# Patient Record
Sex: FEMALE | Race: WHITE | Employment: UNEMPLOYED | URBAN - METROPOLITAN AREA
[De-identification: names, ages, dates, MRNs, and addresses within clinical notes are randomized per-mention and may not be internally consistent; named-entity substitution may affect disease eponyms.]

---

## 2017-04-20 ENCOUNTER — OFFICE VISIT (OUTPATIENT)
Dept: ORTHOPEDIC SURGERY | Age: 62
End: 2017-04-20

## 2017-04-20 ENCOUNTER — TELEPHONE (OUTPATIENT)
Dept: ORTHOPEDIC SURGERY | Age: 62
End: 2017-04-20

## 2017-04-20 VITALS — HEIGHT: 66 IN | BODY MASS INDEX: 25.86 KG/M2 | WEIGHT: 160.94 LBS

## 2017-04-20 DIAGNOSIS — S52.532A CLOSED COLLES' FRACTURE OF LEFT RADIUS, INITIAL ENCOUNTER: ICD-10-CM

## 2017-04-20 DIAGNOSIS — M25.532 LEFT WRIST PAIN: Primary | ICD-10-CM

## 2017-04-20 DIAGNOSIS — G56.02 LEFT CARPAL TUNNEL SYNDROME: ICD-10-CM

## 2017-04-20 PROCEDURE — L3660 SO 8 AB RSTR CAN/WEB PRE OTS: HCPCS | Performed by: ORTHOPAEDIC SURGERY

## 2017-04-20 PROCEDURE — 73110 X-RAY EXAM OF WRIST: CPT | Performed by: ORTHOPAEDIC SURGERY

## 2017-04-20 PROCEDURE — 99203 OFFICE O/P NEW LOW 30 MIN: CPT | Performed by: ORTHOPAEDIC SURGERY

## 2017-04-20 PROCEDURE — 29125 APPL SHORT ARM SPLINT STATIC: CPT | Performed by: ORTHOPAEDIC SURGERY

## 2017-04-20 RX ORDER — OXYCODONE HYDROCHLORIDE AND ACETAMINOPHEN 5; 325 MG/1; MG/1
1 TABLET ORAL EVERY 6 HOURS PRN
Qty: 10 TABLET | Refills: 0 | Status: SHIPPED | OUTPATIENT
Start: 2017-04-20 | End: 2017-04-27

## 2017-04-23 RX ORDER — MORPHINE SULFATE 2 MG/ML
1 INJECTION, SOLUTION INTRAMUSCULAR; INTRAVENOUS EVERY 5 MIN PRN
Status: DISCONTINUED | OUTPATIENT
Start: 2017-04-23 | End: 2017-04-25 | Stop reason: HOSPADM

## 2017-04-23 RX ORDER — DIPHENHYDRAMINE HYDROCHLORIDE 50 MG/ML
12.5 INJECTION INTRAMUSCULAR; INTRAVENOUS
Status: ACTIVE | OUTPATIENT
Start: 2017-04-23 | End: 2017-04-23

## 2017-04-23 RX ORDER — LABETALOL HYDROCHLORIDE 5 MG/ML
5 INJECTION, SOLUTION INTRAVENOUS EVERY 10 MIN PRN
Status: DISCONTINUED | OUTPATIENT
Start: 2017-04-23 | End: 2017-04-25 | Stop reason: HOSPADM

## 2017-04-23 RX ORDER — MEPERIDINE HYDROCHLORIDE 25 MG/ML
12.5 INJECTION INTRAMUSCULAR; INTRAVENOUS; SUBCUTANEOUS EVERY 5 MIN PRN
Status: DISCONTINUED | OUTPATIENT
Start: 2017-04-23 | End: 2017-04-25 | Stop reason: HOSPADM

## 2017-04-23 RX ORDER — OXYCODONE HYDROCHLORIDE AND ACETAMINOPHEN 5; 325 MG/1; MG/1
2 TABLET ORAL PRN
Status: ACTIVE | OUTPATIENT
Start: 2017-04-23 | End: 2017-04-23

## 2017-04-23 RX ORDER — OXYCODONE HYDROCHLORIDE AND ACETAMINOPHEN 5; 325 MG/1; MG/1
1 TABLET ORAL PRN
Status: ACTIVE | OUTPATIENT
Start: 2017-04-23 | End: 2017-04-23

## 2017-04-23 RX ORDER — HYDRALAZINE HYDROCHLORIDE 20 MG/ML
5 INJECTION INTRAMUSCULAR; INTRAVENOUS EVERY 10 MIN PRN
Status: DISCONTINUED | OUTPATIENT
Start: 2017-04-23 | End: 2017-04-25 | Stop reason: HOSPADM

## 2017-04-23 RX ORDER — ONDANSETRON 2 MG/ML
4 INJECTION INTRAMUSCULAR; INTRAVENOUS
Status: ACTIVE | OUTPATIENT
Start: 2017-04-23 | End: 2017-04-23

## 2017-04-23 RX ORDER — MORPHINE SULFATE 2 MG/ML
2 INJECTION, SOLUTION INTRAMUSCULAR; INTRAVENOUS EVERY 5 MIN PRN
Status: DISCONTINUED | OUTPATIENT
Start: 2017-04-23 | End: 2017-04-25 | Stop reason: HOSPADM

## 2017-04-23 RX ORDER — PROMETHAZINE HYDROCHLORIDE 25 MG/ML
6.25 INJECTION, SOLUTION INTRAMUSCULAR; INTRAVENOUS
Status: ACTIVE | OUTPATIENT
Start: 2017-04-23 | End: 2017-04-23

## 2017-04-24 ENCOUNTER — HOSPITAL ENCOUNTER (OUTPATIENT)
Dept: SURGERY | Age: 62
Discharge: HOME | End: 2017-04-24
Attending: ORTHOPAEDIC SURGERY | Admitting: ORTHOPAEDIC SURGERY

## 2017-04-24 VITALS
OXYGEN SATURATION: 95 % | TEMPERATURE: 96.9 F | WEIGHT: 160 LBS | SYSTOLIC BLOOD PRESSURE: 138 MMHG | RESPIRATION RATE: 18 BRPM | BODY MASS INDEX: 25.71 KG/M2 | HEIGHT: 66 IN | DIASTOLIC BLOOD PRESSURE: 70 MMHG | HEART RATE: 95 BPM

## 2017-04-24 RX ORDER — ONDANSETRON 4 MG/1
4 TABLET, FILM COATED ORAL EVERY 8 HOURS PRN
Qty: 30 TABLET | Refills: 2 | Status: SHIPPED | OUTPATIENT
Start: 2017-04-24 | End: 2021-01-01

## 2017-04-24 RX ORDER — LIDOCAINE HYDROCHLORIDE 10 MG/ML
1 INJECTION, SOLUTION EPIDURAL; INFILTRATION; INTRACAUDAL; PERINEURAL
Status: ACTIVE | OUTPATIENT
Start: 2017-04-24 | End: 2017-04-24

## 2017-04-24 RX ORDER — LORATADINE 10 MG/1
10 TABLET ORAL DAILY
COMMUNITY
End: 2021-01-01

## 2017-04-24 RX ORDER — MIDAZOLAM HYDROCHLORIDE 1 MG/ML
INJECTION INTRAMUSCULAR; INTRAVENOUS
Status: DISPENSED
Start: 2017-04-24 | End: 2017-04-24

## 2017-04-24 RX ORDER — OXYCODONE HYDROCHLORIDE AND ACETAMINOPHEN 5; 325 MG/1; MG/1
1-2 TABLET ORAL EVERY 6 HOURS PRN
Qty: 50 TABLET | Refills: 0 | Status: SHIPPED | OUTPATIENT
Start: 2017-04-24 | End: 2017-05-01

## 2017-04-24 RX ORDER — IBUPROFEN 800 MG/1
800 TABLET ORAL EVERY 8 HOURS PRN
Qty: 60 TABLET | Refills: 3 | Status: SHIPPED | OUTPATIENT
Start: 2017-04-24

## 2017-04-24 RX ORDER — ALBUTEROL SULFATE 90 UG/1
2 AEROSOL, METERED RESPIRATORY (INHALATION) EVERY 6 HOURS PRN
COMMUNITY

## 2017-04-24 RX ORDER — FENTANYL CITRATE 50 UG/ML
INJECTION, SOLUTION INTRAMUSCULAR; INTRAVENOUS
Status: DISPENSED
Start: 2017-04-24 | End: 2017-04-24

## 2017-04-24 RX ORDER — SODIUM CHLORIDE, SODIUM LACTATE, POTASSIUM CHLORIDE, CALCIUM CHLORIDE 600; 310; 30; 20 MG/100ML; MG/100ML; MG/100ML; MG/100ML
INJECTION, SOLUTION INTRAVENOUS CONTINUOUS
Status: DISCONTINUED | OUTPATIENT
Start: 2017-04-24 | End: 2017-04-25 | Stop reason: HOSPADM

## 2017-04-24 RX ADMIN — SODIUM CHLORIDE, SODIUM LACTATE, POTASSIUM CHLORIDE, CALCIUM CHLORIDE: 600; 310; 30; 20 INJECTION, SOLUTION INTRAVENOUS at 09:31

## 2017-04-24 ASSESSMENT — PAIN SCALES - GENERAL
PAINLEVEL_OUTOF10: 0

## 2017-05-01 ENCOUNTER — HOSPITAL ENCOUNTER (OUTPATIENT)
Dept: OCCUPATIONAL THERAPY | Age: 62
Discharge: HOME | End: 2017-04-30
Admitting: ORTHOPAEDIC SURGERY

## 2017-05-01 ENCOUNTER — HOSPITAL ENCOUNTER (OUTPATIENT)
Dept: OCCUPATIONAL THERAPY | Age: 62
Discharge: HOME | End: 2017-05-31
Admitting: ORTHOPAEDIC SURGERY

## 2017-05-05 ENCOUNTER — OFFICE VISIT (OUTPATIENT)
Dept: ORTHOPEDIC SURGERY | Age: 62
End: 2017-05-05

## 2017-05-05 VITALS — BODY MASS INDEX: 25.72 KG/M2 | WEIGHT: 160.05 LBS | HEIGHT: 66 IN

## 2017-05-05 DIAGNOSIS — S52.532D CLOSED COLLES' FRACTURE OF LEFT RADIUS WITH ROUTINE HEALING, SUBSEQUENT ENCOUNTER: ICD-10-CM

## 2017-05-05 DIAGNOSIS — M25.532 LEFT WRIST PAIN: Primary | ICD-10-CM

## 2017-05-05 DIAGNOSIS — G56.02 LEFT CARPAL TUNNEL SYNDROME: ICD-10-CM

## 2017-05-05 PROCEDURE — 99024 POSTOP FOLLOW-UP VISIT: CPT | Performed by: ORTHOPAEDIC SURGERY

## 2017-05-05 PROCEDURE — 73110 X-RAY EXAM OF WRIST: CPT | Performed by: ORTHOPAEDIC SURGERY

## 2017-06-02 ENCOUNTER — OFFICE VISIT (OUTPATIENT)
Dept: ORTHOPEDIC SURGERY | Age: 62
End: 2017-06-02

## 2017-06-02 VITALS — BODY MASS INDEX: 25.72 KG/M2 | WEIGHT: 160.05 LBS | HEIGHT: 66 IN

## 2017-06-02 DIAGNOSIS — G56.02 LEFT CARPAL TUNNEL SYNDROME: ICD-10-CM

## 2017-06-02 DIAGNOSIS — S52.532D CLOSED COLLES' FRACTURE OF LEFT RADIUS WITH ROUTINE HEALING, SUBSEQUENT ENCOUNTER: ICD-10-CM

## 2017-06-02 DIAGNOSIS — M25.532 LEFT WRIST PAIN: Primary | ICD-10-CM

## 2017-06-02 PROCEDURE — 73110 X-RAY EXAM OF WRIST: CPT | Performed by: ORTHOPAEDIC SURGERY

## 2017-06-02 PROCEDURE — 99024 POSTOP FOLLOW-UP VISIT: CPT | Performed by: ORTHOPAEDIC SURGERY

## 2021-01-01 ENCOUNTER — OFFICE VISIT (OUTPATIENT)
Dept: ORTHOPEDIC SURGERY | Age: 66
End: 2021-01-01
Payer: COMMERCIAL

## 2021-01-01 ENCOUNTER — HOSPITAL ENCOUNTER (OUTPATIENT)
Dept: CARDIAC CATH/INVASIVE PROCEDURES | Age: 66
Discharge: HOME | DRG: 246 | End: 2021-12-07
Payer: COMMERCIAL

## 2021-01-01 ENCOUNTER — APPOINTMENT (OUTPATIENT)
Dept: GENERAL RADIOLOGY | Age: 66
DRG: 246 | End: 2021-01-01
Payer: COMMERCIAL

## 2021-01-01 ENCOUNTER — APPOINTMENT (OUTPATIENT)
Dept: CT IMAGING | Age: 66
DRG: 246 | End: 2021-01-01
Payer: COMMERCIAL

## 2021-01-01 ENCOUNTER — OFFICE VISIT (OUTPATIENT)
Dept: ORTHOPEDIC SURGERY | Age: 66
End: 2021-01-01

## 2021-01-01 ENCOUNTER — APPOINTMENT (OUTPATIENT)
Dept: GENERAL RADIOLOGY | Age: 66
End: 2021-01-01
Payer: COMMERCIAL

## 2021-01-01 ENCOUNTER — HOSPITAL ENCOUNTER (OUTPATIENT)
Age: 66
Setting detail: OUTPATIENT SURGERY
Discharge: HOME | End: 2021-03-15
Attending: ORTHOPAEDIC SURGERY | Admitting: ORTHOPAEDIC SURGERY
Payer: COMMERCIAL

## 2021-01-01 ENCOUNTER — TELEPHONE (OUTPATIENT)
Dept: ORTHOPEDIC SURGERY | Age: 66
End: 2021-01-01

## 2021-01-01 ENCOUNTER — OFFICE VISIT (OUTPATIENT)
Dept: PRIMARY CARE CLINIC | Age: 66
End: 2021-01-01
Payer: COMMERCIAL

## 2021-01-01 ENCOUNTER — ANESTHESIA EVENT (OUTPATIENT)
Dept: OPERATING ROOM | Age: 66
End: 2021-01-01
Payer: COMMERCIAL

## 2021-01-01 ENCOUNTER — HOSPITAL ENCOUNTER (EMERGENCY)
Age: 66
Discharge: HOME | End: 2021-03-10
Payer: COMMERCIAL

## 2021-01-01 ENCOUNTER — HOSPITAL ENCOUNTER (INPATIENT)
Age: 66
LOS: 2 days | DRG: 246 | End: 2021-12-09
Attending: EMERGENCY MEDICINE | Admitting: INTERNAL MEDICINE
Payer: COMMERCIAL

## 2021-01-01 ENCOUNTER — ANESTHESIA (OUTPATIENT)
Dept: OPERATING ROOM | Age: 66
End: 2021-01-01
Payer: COMMERCIAL

## 2021-01-01 VITALS
DIASTOLIC BLOOD PRESSURE: 53 MMHG | SYSTOLIC BLOOD PRESSURE: 88 MMHG | RESPIRATION RATE: 17 BRPM | OXYGEN SATURATION: 100 %

## 2021-01-01 VITALS
HEIGHT: 66 IN | TEMPERATURE: 97.8 F | DIASTOLIC BLOOD PRESSURE: 75 MMHG | HEART RATE: 99 BPM | RESPIRATION RATE: 16 BRPM | BODY MASS INDEX: 23.3 KG/M2 | OXYGEN SATURATION: 94 % | SYSTOLIC BLOOD PRESSURE: 141 MMHG | WEIGHT: 145 LBS

## 2021-01-01 VITALS
BODY MASS INDEX: 23.3 KG/M2 | WEIGHT: 145 LBS | HEART RATE: 98 BPM | HEIGHT: 66 IN | SYSTOLIC BLOOD PRESSURE: 180 MMHG | RESPIRATION RATE: 18 BRPM | TEMPERATURE: 98.3 F | DIASTOLIC BLOOD PRESSURE: 90 MMHG | OXYGEN SATURATION: 95 %

## 2021-01-01 VITALS
TEMPERATURE: 94.5 F | DIASTOLIC BLOOD PRESSURE: 49 MMHG | SYSTOLIC BLOOD PRESSURE: 86 MMHG | WEIGHT: 166.23 LBS | HEIGHT: 66 IN | OXYGEN SATURATION: 86 % | BODY MASS INDEX: 26.71 KG/M2 | HEART RATE: 92 BPM

## 2021-01-01 VITALS — BODY MASS INDEX: 23.3 KG/M2 | WEIGHT: 145 LBS | HEIGHT: 66 IN

## 2021-01-01 VITALS — WEIGHT: 145 LBS | HEIGHT: 66 IN | BODY MASS INDEX: 23.3 KG/M2

## 2021-01-01 DIAGNOSIS — Z98.890 S/P ORIF (OPEN REDUCTION INTERNAL FIXATION) FRACTURE: Primary | ICD-10-CM

## 2021-01-01 DIAGNOSIS — Z87.81 S/P ORIF (OPEN REDUCTION INTERNAL FIXATION) FRACTURE: Primary | ICD-10-CM

## 2021-01-01 DIAGNOSIS — N17.9 ACUTE KIDNEY INJURY (HCC): ICD-10-CM

## 2021-01-01 DIAGNOSIS — S52.531D CLOSED COLLES' FRACTURE OF RIGHT RADIUS WITH ROUTINE HEALING, SUBSEQUENT ENCOUNTER: ICD-10-CM

## 2021-01-01 DIAGNOSIS — Z09 POSTOPERATIVE EXAMINATION: ICD-10-CM

## 2021-01-01 DIAGNOSIS — Z01.818 PREOP TESTING: Primary | ICD-10-CM

## 2021-01-01 DIAGNOSIS — I46.9 CARDIAC ARREST (HCC): Primary | ICD-10-CM

## 2021-01-01 DIAGNOSIS — S52.531A CLOSED COLLES' FRACTURE OF RIGHT RADIUS, INITIAL ENCOUNTER: Primary | ICD-10-CM
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
## 2021-01-01 DIAGNOSIS — S52.501A CLOSED FRACTURE OF DISTAL ENDS OF RIGHT RADIUS AND ULNA, INITIAL ENCOUNTER: Primary | ICD-10-CM

## 2021-01-01 DIAGNOSIS — R53.1 GENERALIZED WEAKNESS: ICD-10-CM

## 2021-01-01 DIAGNOSIS — S52.601A CLOSED FRACTURE OF DISTAL ENDS OF RIGHT RADIUS AND ULNA, INITIAL ENCOUNTER: Primary | ICD-10-CM

## 2021-01-01 LAB
A/G RATIO: 0.8 (ref 1.1–2.2)
ABO/RH: NORMAL
ALBUMIN SERPL-MCNC: 1.7 G/DL (ref 3.4–5)
ALBUMIN SERPL-MCNC: 1.8 G/DL (ref 3.4–5)
ALBUMIN SERPL-MCNC: 1.9 G/DL (ref 3.4–5)
ALBUMIN SERPL-MCNC: 2 G/DL (ref 3.4–5)
ALBUMIN SERPL-MCNC: 2.5 G/DL (ref 3.4–5)
ALP BLD-CCNC: 123 U/L (ref 40–129)
ALP BLD-CCNC: 252 U/L (ref 40–129)
ALT SERPL-CCNC: 100 U/L (ref 10–40)
ALT SERPL-CCNC: 122 U/L (ref 10–40)
ANION GAP SERPL CALCULATED.3IONS-SCNC: 14 MMOL/L (ref 3–16)
ANION GAP SERPL CALCULATED.3IONS-SCNC: 14 MMOL/L (ref 3–16)
ANION GAP SERPL CALCULATED.3IONS-SCNC: 15 MMOL/L (ref 3–16)
ANION GAP SERPL CALCULATED.3IONS-SCNC: 17 MMOL/L (ref 3–16)
ANION GAP SERPL CALCULATED.3IONS-SCNC: 23 MMOL/L (ref 3–16)
ANION GAP SERPL CALCULATED.3IONS-SCNC: 23 MMOL/L (ref 3–16)
ANION GAP SERPL CALCULATED.3IONS-SCNC: 24 MMOL/L (ref 3–16)
ANION GAP SERPL CALCULATED.3IONS-SCNC: 26 MMOL/L (ref 3–16)
ANISOCYTOSIS: ABNORMAL
ANISOCYTOSIS: ABNORMAL
ANTIBODY SCREEN: NORMAL
APTT: 51.8 SEC (ref 26.2–38.6)
AST SERPL-CCNC: 240 U/L (ref 15–37)
AST SERPL-CCNC: 577 U/L (ref 15–37)
ATYPICAL LYMPHOCYTE RELATIVE PERCENT: 2 % (ref 0–6)
BANDED NEUTROPHILS RELATIVE PERCENT: 40 % (ref 0–7)
BANDED NEUTROPHILS RELATIVE PERCENT: 84 % (ref 0–7)
BASE EXCESS ARTERIAL: -12 (ref -3–3)
BASE EXCESS ARTERIAL: -15 (ref -3–3)
BASE EXCESS ARTERIAL: -16 (ref -3–3)
BASE EXCESS ARTERIAL: -16 (ref -3–3)
BASE EXCESS ARTERIAL: -17 (ref -3–3)
BASE EXCESS ARTERIAL: -18 (ref -3–3)
BASE EXCESS ARTERIAL: -18 (ref -3–3)
BASE EXCESS ARTERIAL: -20 (ref -3–3)
BASE EXCESS ARTERIAL: -22 (ref -3–3)
BASE EXCESS ARTERIAL: -23 (ref -3–3)
BASE EXCESS ARTERIAL: -9 (ref -3–3)
BASE EXCESS VENOUS: -15.8 MMOL/L (ref -3–3)
BASOPHILS ABSOLUTE: 0 K/UL (ref 0–0.2)
BASOPHILS ABSOLUTE: 0 K/UL (ref 0–0.2)
BASOPHILS RELATIVE PERCENT: 0 %
BASOPHILS RELATIVE PERCENT: 0 %
BILIRUB SERPL-MCNC: 0.7 MG/DL (ref 0–1)
BILIRUB SERPL-MCNC: <0.2 MG/DL (ref 0–1)
BILIRUBIN DIRECT: 0.4 MG/DL (ref 0–0.3)
BILIRUBIN, INDIRECT: 0.3 MG/DL (ref 0–1)
BLOOD BANK DISPENSE STATUS: NORMAL
BLOOD BANK DISPENSE STATUS: NORMAL
BLOOD BANK PRODUCT CODE: NORMAL
BLOOD BANK PRODUCT CODE: NORMAL
BPU ID: NORMAL
BPU ID: NORMAL
BUN BLDV-MCNC: 27 MG/DL (ref 7–20)
BUN BLDV-MCNC: 48 MG/DL (ref 7–20)
BUN BLDV-MCNC: 60 MG/DL (ref 7–20)
BUN BLDV-MCNC: 61 MG/DL (ref 7–20)
BUN BLDV-MCNC: 62 MG/DL (ref 7–20)
BUN BLDV-MCNC: 63 MG/DL (ref 7–20)
BUN BLDV-MCNC: 69 MG/DL (ref 7–20)
BUN BLDV-MCNC: 69 MG/DL (ref 7–20)
CALCIUM IONIZED: 0.89 MMOL/L (ref 1.12–1.32)
CALCIUM IONIZED: 0.93 MMOL/L (ref 1.12–1.32)
CALCIUM IONIZED: 0.94 MMOL/L (ref 1.12–1.32)
CALCIUM IONIZED: 0.94 MMOL/L (ref 1.12–1.32)
CALCIUM IONIZED: 0.95 MMOL/L (ref 1.12–1.32)
CALCIUM IONIZED: 1 MMOL/L (ref 1.12–1.32)
CALCIUM IONIZED: 1.02 MMOL/L (ref 1.12–1.32)
CALCIUM IONIZED: 1.17 MMOL/L (ref 1.12–1.32)
CALCIUM IONIZED: 1.19 MMOL/L (ref 1.12–1.32)
CALCIUM IONIZED: 1.2 MMOL/L (ref 1.12–1.32)
CALCIUM SERPL-MCNC: 6.6 MG/DL (ref 8.3–10.6)
CALCIUM SERPL-MCNC: 7.1 MG/DL (ref 8.3–10.6)
CALCIUM SERPL-MCNC: 7.5 MG/DL (ref 8.3–10.6)
CALCIUM SERPL-MCNC: 7.9 MG/DL (ref 8.3–10.6)
CALCIUM SERPL-MCNC: 7.9 MG/DL (ref 8.3–10.6)
CALCIUM SERPL-MCNC: 8 MG/DL (ref 8.3–10.6)
CALCIUM SERPL-MCNC: 8.1 MG/DL (ref 8.3–10.6)
CALCIUM SERPL-MCNC: 8.5 MG/DL (ref 8.3–10.6)
CARBOXYHEMOGLOBIN: 3.5 % (ref 0–1.5)
CHLORIDE BLD-SCNC: 87 MMOL/L (ref 99–110)
CHLORIDE BLD-SCNC: 91 MMOL/L (ref 99–110)
CHLORIDE BLD-SCNC: 93 MMOL/L (ref 99–110)
CHLORIDE BLD-SCNC: 95 MMOL/L (ref 99–110)
CHLORIDE BLD-SCNC: 96 MMOL/L (ref 99–110)
CHLORIDE BLD-SCNC: 96 MMOL/L (ref 99–110)
CHLORIDE BLD-SCNC: 97 MMOL/L (ref 99–110)
CHLORIDE BLD-SCNC: 98 MMOL/L (ref 99–110)
CHOLESTEROL, TOTAL: 94 MG/DL (ref 0–199)
CO2: 13 MMOL/L (ref 21–32)
CO2: 14 MMOL/L (ref 21–32)
CO2: 15 MMOL/L (ref 21–32)
CO2: 17 MMOL/L (ref 21–32)
CO2: 20 MMOL/L (ref 21–32)
CO2: 21 MMOL/L (ref 21–32)
CREAT SERPL-MCNC: 1.4 MG/DL (ref 0.6–1.2)
CREAT SERPL-MCNC: 1.6 MG/DL (ref 0.6–1.2)
CREAT SERPL-MCNC: 1.6 MG/DL (ref 0.6–1.2)
CREAT SERPL-MCNC: 1.9 MG/DL (ref 0.6–1.2)
CREAT SERPL-MCNC: 2 MG/DL (ref 0.6–1.2)
CREAT SERPL-MCNC: 2.2 MG/DL (ref 0.6–1.2)
CREAT SERPL-MCNC: 2.6 MG/DL (ref 0.6–1.2)
CREAT SERPL-MCNC: 3.3 MG/DL (ref 0.6–1.2)
D DIMER: >5250 NG/ML DDU (ref 0–229)
D DIMER: >5250 NG/ML DDU (ref 0–229)
DESCRIPTION BLOOD BANK: NORMAL
DESCRIPTION BLOOD BANK: NORMAL
DOHLE BODIES: PRESENT
EKG ATRIAL RATE: 106 BPM
EKG ATRIAL RATE: 115 BPM
EKG ATRIAL RATE: 134 BPM
EKG DIAGNOSIS: NORMAL
EKG P AXIS: 116 DEGREES
EKG P AXIS: 59 DEGREES
EKG P AXIS: 63 DEGREES
EKG P-R INTERVAL: 112 MS
EKG P-R INTERVAL: 120 MS
EKG P-R INTERVAL: 128 MS
EKG Q-T INTERVAL: 300 MS
EKG Q-T INTERVAL: 358 MS
EKG Q-T INTERVAL: 370 MS
EKG QRS DURATION: 88 MS
EKG QRS DURATION: 92 MS
EKG QRS DURATION: 98 MS
EKG QTC CALCULATION (BAZETT): 448 MS
EKG QTC CALCULATION (BAZETT): 491 MS
EKG QTC CALCULATION (BAZETT): 495 MS
EKG R AXIS: 103 DEGREES
EKG R AXIS: 81 DEGREES
EKG R AXIS: 82 DEGREES
EKG T AXIS: -44 DEGREES
EKG T AXIS: 126 DEGREES
EKG T AXIS: 63 DEGREES
EKG VENTRICULAR RATE: 106 BPM
EKG VENTRICULAR RATE: 115 BPM
EKG VENTRICULAR RATE: 134 BPM
EOSINOPHILS ABSOLUTE: 0 K/UL (ref 0–0.6)
EOSINOPHILS ABSOLUTE: 0 K/UL (ref 0–0.6)
EOSINOPHILS RELATIVE PERCENT: 0 %
EOSINOPHILS RELATIVE PERCENT: 0 %
FIBRINOGEN: 164 MG/DL (ref 200–397)
GFR AFRICAN AMERICAN: 17
GFR AFRICAN AMERICAN: 22
GFR AFRICAN AMERICAN: 22
GFR AFRICAN AMERICAN: 27
GFR AFRICAN AMERICAN: 30
GFR AFRICAN AMERICAN: 32
GFR AFRICAN AMERICAN: 39
GFR AFRICAN AMERICAN: 39
GFR AFRICAN AMERICAN: 46
GFR NON-AFRICAN AMERICAN: 14
GFR NON-AFRICAN AMERICAN: 18
GFR NON-AFRICAN AMERICAN: 18
GFR NON-AFRICAN AMERICAN: 22
GFR NON-AFRICAN AMERICAN: 25
GFR NON-AFRICAN AMERICAN: 26
GFR NON-AFRICAN AMERICAN: 32
GFR NON-AFRICAN AMERICAN: 32
GFR NON-AFRICAN AMERICAN: 38
GLUCOSE BLD-MCNC: 104 MG/DL (ref 70–99)
GLUCOSE BLD-MCNC: 110 MG/DL (ref 70–99)
GLUCOSE BLD-MCNC: 119 MG/DL (ref 70–99)
GLUCOSE BLD-MCNC: 130 MG/DL (ref 70–99)
GLUCOSE BLD-MCNC: 133 MG/DL (ref 70–99)
GLUCOSE BLD-MCNC: 146 MG/DL (ref 70–99)
GLUCOSE BLD-MCNC: 167 MG/DL (ref 70–99)
GLUCOSE BLD-MCNC: 42 MG/DL (ref 70–99)
GLUCOSE BLD-MCNC: 54 MG/DL (ref 70–99)
GLUCOSE BLD-MCNC: 56 MG/DL (ref 70–99)
GLUCOSE BLD-MCNC: 61 MG/DL (ref 70–99)
GLUCOSE BLD-MCNC: 64 MG/DL (ref 70–99)
GLUCOSE BLD-MCNC: 65 MG/DL (ref 70–99)
GLUCOSE BLD-MCNC: 73 MG/DL (ref 70–99)
GLUCOSE BLD-MCNC: 78 MG/DL (ref 70–99)
GLUCOSE BLD-MCNC: 81 MG/DL (ref 70–99)
GLUCOSE BLD-MCNC: 82 MG/DL (ref 70–99)
GLUCOSE BLD-MCNC: 84 MG/DL (ref 70–99)
GLUCOSE BLD-MCNC: 85 MG/DL (ref 70–99)
GLUCOSE BLD-MCNC: 86 MG/DL (ref 70–99)
GLUCOSE BLD-MCNC: 87 MG/DL (ref 70–99)
GLUCOSE BLD-MCNC: 90 MG/DL (ref 70–99)
GLUCOSE BLD-MCNC: 93 MG/DL (ref 70–99)
HCO3 ARTERIAL: 10.1 MMOL/L (ref 21–29)
HCO3 ARTERIAL: 11.4 MMOL/L (ref 21–29)
HCO3 ARTERIAL: 11.8 MMOL/L (ref 21–29)
HCO3 ARTERIAL: 12 MMOL/L (ref 21–29)
HCO3 ARTERIAL: 12.9 MMOL/L (ref 21–29)
HCO3 ARTERIAL: 14 MMOL/L (ref 21–29)
HCO3 ARTERIAL: 14 MMOL/L (ref 21–29)
HCO3 ARTERIAL: 15.4 MMOL/L (ref 21–29)
HCO3 ARTERIAL: 15.5 MMOL/L (ref 21–29)
HCO3 ARTERIAL: 15.6 MMOL/L (ref 21–29)
HCO3 ARTERIAL: 17.9 MMOL/L (ref 21–29)
HCO3 ARTERIAL: 18.5 MMOL/L (ref 21–29)
HCO3 ARTERIAL: 8.9 MMOL/L (ref 21–29)
HCO3 VENOUS: 12.6 MMOL/L (ref 23–29)
HCT VFR BLD CALC: 19.6 % (ref 36–48)
HCT VFR BLD CALC: 21 % (ref 36–48)
HCT VFR BLD CALC: 23.6 % (ref 36–48)
HCT VFR BLD CALC: 27.1 % (ref 36–48)
HCT VFR BLD CALC: 29 % (ref 36–48)
HCT VFR BLD CALC: 32.2 % (ref 36–48)
HCT VFR BLD CALC: 36.9 % (ref 36–48)
HDLC SERPL-MCNC: 10 MG/DL (ref 40–60)
HEMOGLOBIN: 10.8 G/DL (ref 12–16)
HEMOGLOBIN: 11.9 G/DL (ref 12–16)
HEMOGLOBIN: 12.7 GM/DL (ref 12–16)
HEMOGLOBIN: 5.9 GM/DL (ref 12–16)
HEMOGLOBIN: 6.1 G/DL (ref 12–16)
HEMOGLOBIN: 6.3 GM/DL (ref 12–16)
HEMOGLOBIN: 6.7 G/DL (ref 12–16)
HEMOGLOBIN: 7.6 G/DL (ref 12–16)
HEMOGLOBIN: 8.2 GM/DL (ref 12–16)
HEMOGLOBIN: 8.7 G/DL (ref 12–16)
HEMOGLOBIN: 8.9 GM/DL (ref 12–16)
HEMOGLOBIN: 9.1 GM/DL (ref 12–16)
HEMOGLOBIN: 9.3 GM/DL (ref 12–16)
HEMOGLOBIN: 9.4 G/DL (ref 12–16)
INR BLD: 1.9 (ref 0.88–1.12)
LACTATE: 17.11 MMOL/L (ref 0.4–2)
LACTATE: 19.8 MMOL/L (ref 0.4–2)
LACTATE: 4.45 MMOL/L (ref 0.4–2)
LACTATE: 4.5 MMOL/L (ref 0.4–2)
LACTATE: >20 MMOL/L (ref 0.4–2)
LACTATE: >20 MMOL/L (ref 0.4–2)
LACTIC ACID, SEPSIS: 9.9 MMOL/L (ref 0.4–1.9)
LACTIC ACID: 1.6 MMOL/L (ref 0.4–2)
LACTIC ACID: 1.7 MMOL/L (ref 0.4–2)
LACTIC ACID: 1.7 MMOL/L (ref 0.4–2)
LACTIC ACID: 11.6 MMOL/L (ref 0.4–2)
LACTIC ACID: 13.3 MMOL/L (ref 0.4–2)
LACTIC ACID: 17 MMOL/L (ref 0.4–2)
LACTIC ACID: 2.5 MMOL/L (ref 0.4–2)
LDL CHOLESTEROL CALCULATED: ABNORMAL MG/DL
LDL CHOLESTEROL DIRECT: 10 MG/DL
LV EF: 48 %
LVEF MODALITY: NORMAL
LYMPHOCYTES ABSOLUTE: 1.7 K/UL (ref 1–5.1)
LYMPHOCYTES ABSOLUTE: 3.4 K/UL (ref 1–5.1)
LYMPHOCYTES RELATIVE PERCENT: 14 %
LYMPHOCYTES RELATIVE PERCENT: 6 %
MACROCYTES: ABNORMAL
MACROCYTES: ABNORMAL
MAGNESIUM: 2.6 MG/DL (ref 1.8–2.4)
MAGNESIUM: 3.1 MG/DL (ref 1.8–2.4)
MCH RBC QN AUTO: 29.6 PG (ref 26–34)
MCH RBC QN AUTO: 29.7 PG (ref 26–34)
MCH RBC QN AUTO: 30.3 PG (ref 26–34)
MCHC RBC AUTO-ENTMCNC: 31.7 G/DL (ref 31–36)
MCHC RBC AUTO-ENTMCNC: 32.3 G/DL (ref 31–36)
MCHC RBC AUTO-ENTMCNC: 33.6 G/DL (ref 31–36)
MCV RBC AUTO: 90.2 FL (ref 80–100)
MCV RBC AUTO: 91.9 FL (ref 80–100)
MCV RBC AUTO: 93.3 FL (ref 80–100)
METAMYELOCYTES RELATIVE PERCENT: 8 %
METHEMOGLOBIN VENOUS: 0 %
MICROCYTES: ABNORMAL
MICROCYTES: ABNORMAL
MONOCYTES ABSOLUTE: 0.2 K/UL (ref 0–1.3)
MONOCYTES ABSOLUTE: 1 K/UL (ref 0–1.3)
MONOCYTES RELATIVE PERCENT: 1 %
MONOCYTES RELATIVE PERCENT: 4 %
MYELOCYTE PERCENT: 1 %
NEUTROPHILS ABSOLUTE: 19.3 K/UL (ref 1.7–7.7)
NEUTROPHILS ABSOLUTE: 20 K/UL (ref 1.7–7.7)
NEUTROPHILS RELATIVE PERCENT: 33 %
NEUTROPHILS RELATIVE PERCENT: 7 %
O2 SAT, ARTERIAL: 100 % (ref 93–100)
O2 SAT, ARTERIAL: 74 % (ref 93–100)
O2 SAT, ARTERIAL: 86 % (ref 93–100)
O2 SAT, ARTERIAL: 87 % (ref 93–100)
O2 SAT, ARTERIAL: 88 % (ref 93–100)
O2 SAT, ARTERIAL: 89 % (ref 93–100)
O2 SAT, ARTERIAL: 92 % (ref 93–100)
O2 SAT, ARTERIAL: 94 % (ref 93–100)
O2 SAT, ARTERIAL: 94 % (ref 93–100)
O2 SAT, ARTERIAL: 98 % (ref 93–100)
O2 SAT, ARTERIAL: 98 % (ref 93–100)
O2 SAT, ARTERIAL: 99 % (ref 93–100)
O2 SAT, ARTERIAL: 99 % (ref 93–100)
O2 SAT, VEN: 82 %
O2 THERAPY: ABNORMAL
PCO2 ARTERIAL: 41.6 MM HG (ref 35–45)
PCO2 ARTERIAL: 41.8 MM HG (ref 35–45)
PCO2 ARTERIAL: 44.7 MM HG (ref 35–45)
PCO2 ARTERIAL: 46.2 MM HG (ref 35–45)
PCO2 ARTERIAL: 47.5 MM HG (ref 35–45)
PCO2 ARTERIAL: 51 MM HG (ref 35–45)
PCO2 ARTERIAL: 51.5 MM HG (ref 35–45)
PCO2 ARTERIAL: 53.4 MM HG (ref 35–45)
PCO2 ARTERIAL: 54.5 MM HG (ref 35–45)
PCO2 ARTERIAL: 57.6 MM HG (ref 35–45)
PCO2 ARTERIAL: 60.4 MM HG (ref 35–45)
PCO2 ARTERIAL: 60.9 MM HG (ref 35–45)
PCO2 ARTERIAL: 61.8 MM HG (ref 35–45)
PCO2, VEN: 38.7 MMHG (ref 40–50)
PDW BLD-RTO: 13.5 % (ref 12.4–15.4)
PDW BLD-RTO: 14 % (ref 12.4–15.4)
PDW BLD-RTO: 14.5 % (ref 12.4–15.4)
PERFORMED ON: ABNORMAL
PERFORMED ON: NORMAL
PERFORMED ON: NORMAL
PH ARTERIAL: 6.94 (ref 7.35–7.45)
PH ARTERIAL: 6.96 (ref 7.35–7.45)
PH ARTERIAL: 6.97 (ref 7.35–7.45)
PH ARTERIAL: 6.97 (ref 7.35–7.45)
PH ARTERIAL: 6.98 (ref 7.35–7.45)
PH ARTERIAL: 6.99 (ref 7.35–7.45)
PH ARTERIAL: 7.01 (ref 7.35–7.45)
PH ARTERIAL: 7.02 (ref 7.35–7.45)
PH ARTERIAL: 7.02 (ref 7.35–7.45)
PH ARTERIAL: 7.05 (ref 7.35–7.45)
PH ARTERIAL: 7.07 (ref 7.35–7.45)
PH ARTERIAL: 7.1 (ref 7.35–7.45)
PH ARTERIAL: 7.26 (ref 7.35–7.45)
PH VENOUS: 7.01 (ref 7.35–7.45)
PH VENOUS: 7.08 (ref 7.35–7.45)
PH VENOUS: 7.09 (ref 7.35–7.45)
PH VENOUS: 7.13 (ref 7.35–7.45)
PHOSPHORUS: 5.2 MG/DL (ref 2.5–4.9)
PHOSPHORUS: 6.1 MG/DL (ref 2.5–4.9)
PHOSPHORUS: 8.9 MG/DL (ref 2.5–4.9)
PLATELET # BLD: 151 K/UL (ref 135–450)
PLATELET # BLD: 65 K/UL (ref 135–450)
PLATELET # BLD: 98 K/UL (ref 135–450)
PLATELET SLIDE REVIEW: ABNORMAL
PLATELET SLIDE REVIEW: ADEQUATE
PMV BLD AUTO: 8.9 FL (ref 5–10.5)
PMV BLD AUTO: 9.2 FL (ref 5–10.5)
PMV BLD AUTO: 9.6 FL (ref 5–10.5)
PO2 ARTERIAL: 108.3 MM HG (ref 75–108)
PO2 ARTERIAL: 133 MM HG (ref 75–108)
PO2 ARTERIAL: 156.5 MM HG (ref 75–108)
PO2 ARTERIAL: 162.7 MM HG (ref 75–108)
PO2 ARTERIAL: 181.9 MM HG (ref 75–108)
PO2 ARTERIAL: 540.1 MM HG (ref 75–108)
PO2 ARTERIAL: 58.2 MM HG (ref 75–108)
PO2 ARTERIAL: 79.6 MM HG (ref 75–108)
PO2 ARTERIAL: 79.7 MM HG (ref 75–108)
PO2 ARTERIAL: 82.7 MM HG (ref 75–108)
PO2 ARTERIAL: 90.8 MM HG (ref 75–108)
PO2 ARTERIAL: 92 MM HG (ref 75–108)
PO2 ARTERIAL: 98.8 MM HG (ref 75–108)
PO2, VEN: 62.2 MMHG (ref 25–40)
POC ACT LR: 171 SEC
POC ACT LR: 183 SEC
POC ACT LR: 184 SEC
POC ACT LR: 185 SEC
POC ACT LR: 190 SEC
POC ACT LR: 193 SEC
POC CHLORIDE: 91 MMOL/L (ref 99–110)
POC CREATININE: 2.6 MG/DL (ref 0.6–1.2)
POC HEMATOCRIT: 17 % (ref 36–48)
POC HEMATOCRIT: 18 % (ref 36–48)
POC HEMATOCRIT: 24 % (ref 36–48)
POC HEMATOCRIT: 26 % (ref 36–48)
POC HEMATOCRIT: 27 % (ref 36–48)
POC HEMATOCRIT: 27 % (ref 36–48)
POC HEMATOCRIT: 37 % (ref 36–48)
POC POTASSIUM: 3.5 MMOL/L (ref 3.5–5.1)
POC POTASSIUM: 5.1 MMOL/L (ref 3.5–5.1)
POC POTASSIUM: 5.3 MMOL/L (ref 3.5–5.1)
POC POTASSIUM: 5.3 MMOL/L (ref 3.5–5.1)
POC POTASSIUM: 5.5 MMOL/L (ref 3.5–5.1)
POC POTASSIUM: 6 MMOL/L (ref 3.5–5.1)
POC POTASSIUM: 6.2 MMOL/L (ref 3.5–5.1)
POC SAMPLE TYPE: ABNORMAL
POC SODIUM: 125 MMOL/L (ref 136–145)
POC SODIUM: 130 MMOL/L (ref 136–145)
POC SODIUM: 132 MMOL/L (ref 136–145)
POC SODIUM: 134 MMOL/L (ref 136–145)
POIKILOCYTES: ABNORMAL
POIKILOCYTES: ABNORMAL
POLYCHROMASIA: ABNORMAL
POLYCHROMASIA: ABNORMAL
POTASSIUM REFLEX MAGNESIUM: 3.3 MMOL/L (ref 3.5–5.1)
POTASSIUM REFLEX MAGNESIUM: 3.4 MMOL/L (ref 3.5–5.1)
POTASSIUM REFLEX MAGNESIUM: 4.4 MMOL/L (ref 3.5–5.1)
POTASSIUM REFLEX MAGNESIUM: 4.7 MMOL/L (ref 3.5–5.1)
POTASSIUM SERPL-SCNC: 3.7 MMOL/L (ref 3.5–5.1)
POTASSIUM SERPL-SCNC: 4.2 MMOL/L (ref 3.5–5.1)
POTASSIUM SERPL-SCNC: 4.4 MMOL/L (ref 3.5–5.1)
POTASSIUM SERPL-SCNC: 5.1 MMOL/L (ref 3.5–5.1)
POTASSIUM SERPL-SCNC: 5.2 MMOL/L (ref 3.5–5.1)
PROCALCITONIN: >100 NG/ML (ref 0–0.15)
PROTHROMBIN TIME: 22 SEC (ref 9.9–12.7)
RBC # BLD: 2.25 M/UL (ref 4–5.2)
RBC # BLD: 3.57 M/UL (ref 4–5.2)
RBC # BLD: 4.02 M/UL (ref 4–5.2)
RHEUMATOID FACTOR: <10 IU/ML
SARS-COV-2, NAAT: NOT DETECTED
SARS-COV-2, NAAT: NOT DETECTED
SARS-COV-2: NOT DETECTED
SLIDE REVIEW: ABNORMAL
SLIDE REVIEW: ABNORMAL
SODIUM BLD-SCNC: 125 MMOL/L (ref 136–145)
SODIUM BLD-SCNC: 126 MMOL/L (ref 136–145)
SODIUM BLD-SCNC: 128 MMOL/L (ref 136–145)
SODIUM BLD-SCNC: 131 MMOL/L (ref 136–145)
SODIUM BLD-SCNC: 132 MMOL/L (ref 136–145)
SODIUM BLD-SCNC: 132 MMOL/L (ref 136–145)
SODIUM BLD-SCNC: 133 MMOL/L (ref 136–145)
SODIUM BLD-SCNC: 136 MMOL/L (ref 136–145)
SPECIMEN STATUS: NORMAL
TCO2 ARTERIAL: 10 MMOL/L
TCO2 ARTERIAL: 12 MMOL/L
TCO2 ARTERIAL: 13 MMOL/L
TCO2 ARTERIAL: 13 MMOL/L
TCO2 ARTERIAL: 14 MMOL/L
TCO2 ARTERIAL: 14 MMOL/L
TCO2 ARTERIAL: 16 MMOL/L
TCO2 ARTERIAL: 16 MMOL/L
TCO2 ARTERIAL: 17 MMOL/L
TCO2 ARTERIAL: 17 MMOL/L
TCO2 ARTERIAL: 18 MMOL/L
TCO2 ARTERIAL: 20 MMOL/L
TCO2 ARTERIAL: 20 MMOL/L
TCO2 CALC VENOUS: 14 MMOL/L
TEAR DROP CELLS: ABNORMAL
TOTAL PROTEIN: 3.4 G/DL (ref 6.4–8.2)
TOTAL PROTEIN: 5.5 G/DL (ref 6.4–8.2)
TOXIC GRANULATION: PRESENT
TRIGL SERPL-MCNC: 487 MG/DL (ref 0–150)
TROPONIN: 0.13 NG/ML
TROPONIN: 0.16 NG/ML
TROPONIN: 0.19 NG/ML
TROPONIN: <0.01 NG/ML
VACUOLATED NEUTROPHILS: PRESENT
VACUOLATED NEUTROPHILS: PRESENT
VLDLC SERPL CALC-MCNC: ABNORMAL MG/DL
WBC # BLD: 16.3 K/UL (ref 4–11)
WBC # BLD: 21.2 K/UL (ref 4–11)
WBC # BLD: 24.4 K/UL (ref 4–11)

## 2021-01-01 PROCEDURE — 6360000002 HC RX W HCPCS: Performed by: NURSE PRACTITIONER

## 2021-01-01 PROCEDURE — 36592 COLLECT BLOOD FROM PICC: CPT

## 2021-01-01 PROCEDURE — 85347 COAGULATION TIME ACTIVATED: CPT

## 2021-01-01 PROCEDURE — 85025 COMPLETE CBC W/AUTO DIFF WBC: CPT

## 2021-01-01 PROCEDURE — 85014 HEMATOCRIT: CPT

## 2021-01-01 PROCEDURE — 6360000002 HC RX W HCPCS

## 2021-01-01 PROCEDURE — 94003 VENT MGMT INPAT SUBQ DAY: CPT

## 2021-01-01 PROCEDURE — 2709999900 HC NON-CHARGEABLE SUPPLY

## 2021-01-01 PROCEDURE — 2580000003 HC RX 258: Performed by: ORTHOPAEDIC SURGERY

## 2021-01-01 PROCEDURE — 87040 BLOOD CULTURE FOR BACTERIA: CPT

## 2021-01-01 PROCEDURE — 2500000003 HC RX 250 WO HCPCS: Performed by: NURSE ANESTHETIST, CERTIFIED REGISTERED

## 2021-01-01 PROCEDURE — L3807 WHFO W/O JOINTS PRE CST: HCPCS | Performed by: ORTHOPAEDIC SURGERY

## 2021-01-01 PROCEDURE — C1874 STENT, COATED/COV W/DEL SYS: HCPCS

## 2021-01-01 PROCEDURE — 05HM33Z INSERTION OF INFUSION DEVICE INTO RIGHT INTERNAL JUGULAR VEIN, PERCUTANEOUS APPROACH: ICD-10-PCS | Performed by: NURSE PRACTITIONER

## 2021-01-01 PROCEDURE — 36430 TRANSFUSION BLD/BLD COMPNT: CPT

## 2021-01-01 PROCEDURE — 93005 ELECTROCARDIOGRAM TRACING: CPT | Performed by: INTERNAL MEDICINE

## 2021-01-01 PROCEDURE — 93010 ELECTROCARDIOGRAM REPORT: CPT | Performed by: INTERNAL MEDICINE

## 2021-01-01 PROCEDURE — 2580000003 HC RX 258: Performed by: INTERNAL MEDICINE

## 2021-01-01 PROCEDURE — 82330 ASSAY OF CALCIUM: CPT

## 2021-01-01 PROCEDURE — 6370000000 HC RX 637 (ALT 250 FOR IP): Performed by: INTERNAL MEDICINE

## 2021-01-01 PROCEDURE — 85379 FIBRIN DEGRADATION QUANT: CPT

## 2021-01-01 PROCEDURE — 31500 INSERT EMERGENCY AIRWAY: CPT

## 2021-01-01 PROCEDURE — 94640 AIRWAY INHALATION TREATMENT: CPT

## 2021-01-01 PROCEDURE — 2709999900 HC NON-CHARGEABLE SUPPLY: Performed by: ORTHOPAEDIC SURGERY

## 2021-01-01 PROCEDURE — 99211 OFF/OP EST MAY X REQ PHY/QHP: CPT | Performed by: NURSE PRACTITIONER

## 2021-01-01 PROCEDURE — 82103 ALPHA-1-ANTITRYPSIN TOTAL: CPT

## 2021-01-01 PROCEDURE — C1713 ANCHOR/SCREW BN/BN,TIS/BN: HCPCS | Performed by: ORTHOPAEDIC SURGERY

## 2021-01-01 PROCEDURE — 3600000004 HC SURGERY LEVEL 4 BASE: Performed by: ORTHOPAEDIC SURGERY

## 2021-01-01 PROCEDURE — 84295 ASSAY OF SERUM SODIUM: CPT

## 2021-01-01 PROCEDURE — 2500000003 HC RX 250 WO HCPCS: Performed by: INTERNAL MEDICINE

## 2021-01-01 PROCEDURE — 6360000002 HC RX W HCPCS: Performed by: INTERNAL MEDICINE

## 2021-01-01 PROCEDURE — APPNB60 APP NON BILLABLE TIME 46-60 MINS: Performed by: NURSE PRACTITIONER

## 2021-01-01 PROCEDURE — 99284 EMERGENCY DEPT VISIT MOD MDM: CPT

## 2021-01-01 PROCEDURE — 99024 POSTOP FOLLOW-UP VISIT: CPT | Performed by: ORTHOPAEDIC SURGERY

## 2021-01-01 PROCEDURE — 83605 ASSAY OF LACTIC ACID: CPT

## 2021-01-01 PROCEDURE — 73110 X-RAY EXAM OF WRIST: CPT

## 2021-01-01 PROCEDURE — 2500000003 HC RX 250 WO HCPCS

## 2021-01-01 PROCEDURE — 94761 N-INVAS EAR/PLS OXIMETRY MLT: CPT

## 2021-01-01 PROCEDURE — 83721 ASSAY OF BLOOD LIPOPROTEIN: CPT

## 2021-01-01 PROCEDURE — C9113 INJ PANTOPRAZOLE SODIUM, VIA: HCPCS | Performed by: INTERNAL MEDICINE

## 2021-01-01 PROCEDURE — 99204 OFFICE O/P NEW MOD 45 MIN: CPT | Performed by: ORTHOPAEDIC SURGERY

## 2021-01-01 PROCEDURE — 82803 BLOOD GASES ANY COMBINATION: CPT

## 2021-01-01 PROCEDURE — 84484 ASSAY OF TROPONIN QUANT: CPT

## 2021-01-01 PROCEDURE — 86235 NUCLEAR ANTIGEN ANTIBODY: CPT

## 2021-01-01 PROCEDURE — 87186 SC STD MICRODIL/AGAR DIL: CPT

## 2021-01-01 PROCEDURE — 70450 CT HEAD/BRAIN W/O DYE: CPT

## 2021-01-01 PROCEDURE — 2580000003 HC RX 258: Performed by: ANESTHESIOLOGY

## 2021-01-01 PROCEDURE — 2720000010 HC SURG SUPPLY STERILE: Performed by: ORTHOPAEDIC SURGERY

## 2021-01-01 PROCEDURE — 2580000003 HC RX 258: Performed by: NURSE PRACTITIONER

## 2021-01-01 PROCEDURE — 36415 COLL VENOUS BLD VENIPUNCTURE: CPT

## 2021-01-01 PROCEDURE — 6360000002 HC RX W HCPCS: Performed by: ANESTHESIOLOGY

## 2021-01-01 PROCEDURE — 2000000000 HC ICU R&B

## 2021-01-01 PROCEDURE — C9606 PERC D-E COR REVASC W AMI S: HCPCS

## 2021-01-01 PROCEDURE — 87635 SARS-COV-2 COVID-19 AMP PRB: CPT

## 2021-01-01 PROCEDURE — 80053 COMPREHEN METABOLIC PANEL: CPT

## 2021-01-01 PROCEDURE — 80076 HEPATIC FUNCTION PANEL: CPT

## 2021-01-01 PROCEDURE — 3700000001 HC ADD 15 MINUTES (ANESTHESIA): Performed by: ORTHOPAEDIC SURGERY

## 2021-01-01 PROCEDURE — 99291 CRITICAL CARE FIRST HOUR: CPT | Performed by: INTERNAL MEDICINE

## 2021-01-01 PROCEDURE — P9016 RBC LEUKOCYTES REDUCED: HCPCS

## 2021-01-01 PROCEDURE — 93005 ELECTROCARDIOGRAM TRACING: CPT | Performed by: NURSE PRACTITIONER

## 2021-01-01 PROCEDURE — 82784 ASSAY IGA/IGD/IGG/IGM EACH: CPT

## 2021-01-01 PROCEDURE — 71045 X-RAY EXAM CHEST 1 VIEW: CPT

## 2021-01-01 PROCEDURE — 7100000000 HC PACU RECOVERY - FIRST 15 MIN: Performed by: ORTHOPAEDIC SURGERY

## 2021-01-01 PROCEDURE — 2500000003 HC RX 250 WO HCPCS: Performed by: NURSE PRACTITIONER

## 2021-01-01 PROCEDURE — 82947 ASSAY GLUCOSE BLOOD QUANT: CPT

## 2021-01-01 PROCEDURE — 86923 COMPATIBILITY TEST ELECTRIC: CPT

## 2021-01-01 PROCEDURE — 36556 INSERT NON-TUNNEL CV CATH: CPT | Performed by: NURSE PRACTITIONER

## 2021-01-01 PROCEDURE — 7100000001 HC PACU RECOVERY - ADDTL 15 MIN: Performed by: ORTHOPAEDIC SURGERY

## 2021-01-01 PROCEDURE — 6370000000 HC RX 637 (ALT 250 FOR IP): Performed by: NURSE PRACTITIONER

## 2021-01-01 PROCEDURE — P9045 ALBUMIN (HUMAN), 5%, 250 ML: HCPCS | Performed by: INTERNAL MEDICINE

## 2021-01-01 PROCEDURE — 4A023N7 MEASUREMENT OF CARDIAC SAMPLING AND PRESSURE, LEFT HEART, PERCUTANEOUS APPROACH: ICD-10-PCS | Performed by: INTERNAL MEDICINE

## 2021-01-01 PROCEDURE — 85018 HEMOGLOBIN: CPT

## 2021-01-01 PROCEDURE — 86431 RHEUMATOID FACTOR QUANT: CPT

## 2021-01-01 PROCEDURE — 80069 RENAL FUNCTION PANEL: CPT

## 2021-01-01 PROCEDURE — 93005 ELECTROCARDIOGRAM TRACING: CPT | Performed by: EMERGENCY MEDICINE

## 2021-01-01 PROCEDURE — 84132 ASSAY OF SERUM POTASSIUM: CPT

## 2021-01-01 PROCEDURE — 29125 APPL SHORT ARM SPLINT STATIC: CPT

## 2021-01-01 PROCEDURE — 3600000014 HC SURGERY LEVEL 4 ADDTL 15MIN: Performed by: ORTHOPAEDIC SURGERY

## 2021-01-01 PROCEDURE — 86850 RBC ANTIBODY SCREEN: CPT

## 2021-01-01 PROCEDURE — 80048 BASIC METABOLIC PNL TOTAL CA: CPT

## 2021-01-01 PROCEDURE — 36620 INSERTION CATHETER ARTERY: CPT | Performed by: NURSE PRACTITIONER

## 2021-01-01 PROCEDURE — 92978 ENDOLUMINL IVUS OCT C 1ST: CPT | Performed by: INTERNAL MEDICINE

## 2021-01-01 PROCEDURE — 80061 LIPID PANEL: CPT

## 2021-01-01 PROCEDURE — 25607 OPTX DST RD XARTC FX/EPI SEP: CPT | Performed by: ORTHOPAEDIC SURGERY

## 2021-01-01 PROCEDURE — C1887 CATHETER, GUIDING: HCPCS

## 2021-01-01 PROCEDURE — 2720000000 HC MISC SURG SUPPLY STERILE $0-50: Performed by: ORTHOPAEDIC SURGERY

## 2021-01-01 PROCEDURE — 2500000003 HC RX 250 WO HCPCS: Performed by: ANESTHESIOLOGY

## 2021-01-01 PROCEDURE — C8929 TTE W OR WO FOL WCON,DOPPLER: HCPCS

## 2021-01-01 PROCEDURE — 2700000000 HC OXYGEN THERAPY PER DAY

## 2021-01-01 PROCEDURE — 7100000010 HC PHASE II RECOVERY - FIRST 15 MIN: Performed by: ORTHOPAEDIC SURGERY

## 2021-01-01 PROCEDURE — 5A1945Z RESPIRATORY VENTILATION, 24-96 CONSECUTIVE HOURS: ICD-10-PCS | Performed by: INTERNAL MEDICINE

## 2021-01-01 PROCEDURE — 85384 FIBRINOGEN ACTIVITY: CPT

## 2021-01-01 PROCEDURE — 6370000000 HC RX 637 (ALT 250 FOR IP): Performed by: ANESTHESIOLOGY

## 2021-01-01 PROCEDURE — 6360000004 HC RX CONTRAST MEDICATION

## 2021-01-01 PROCEDURE — 2720000010 HC SURG SUPPLY STERILE

## 2021-01-01 PROCEDURE — 93458 L HRT ARTERY/VENTRICLE ANGIO: CPT | Performed by: INTERNAL MEDICINE

## 2021-01-01 PROCEDURE — 87150 DNA/RNA AMPLIFIED PROBE: CPT

## 2021-01-01 PROCEDURE — 6360000002 HC RX W HCPCS: Performed by: ORTHOPAEDIC SURGERY

## 2021-01-01 PROCEDURE — 99152 MOD SED SAME PHYS/QHP 5/>YRS: CPT | Performed by: INTERNAL MEDICINE

## 2021-01-01 PROCEDURE — 85610 PROTHROMBIN TIME: CPT

## 2021-01-01 PROCEDURE — 82785 ASSAY OF IGE: CPT

## 2021-01-01 PROCEDURE — 71250 CT THORAX DX C-: CPT

## 2021-01-01 PROCEDURE — 83735 ASSAY OF MAGNESIUM: CPT

## 2021-01-01 PROCEDURE — 82435 ASSAY OF BLOOD CHLORIDE: CPT

## 2021-01-01 PROCEDURE — 64415 NJX AA&/STRD BRCH PLXS IMG: CPT | Performed by: ANESTHESIOLOGY

## 2021-01-01 PROCEDURE — C1725 CATH, TRANSLUMIN NON-LASER: HCPCS

## 2021-01-01 PROCEDURE — 7100000011 HC PHASE II RECOVERY - ADDTL 15 MIN: Performed by: ORTHOPAEDIC SURGERY

## 2021-01-01 PROCEDURE — 86901 BLOOD TYPING SEROLOGIC RH(D): CPT

## 2021-01-01 PROCEDURE — C1769 GUIDE WIRE: HCPCS

## 2021-01-01 PROCEDURE — 92928 PRQ TCAT PLMT NTRAC ST 1 LES: CPT | Performed by: INTERNAL MEDICINE

## 2021-01-01 PROCEDURE — 6360000002 HC RX W HCPCS: Performed by: PHYSICIAN ASSISTANT

## 2021-01-01 PROCEDURE — 84145 PROCALCITONIN (PCT): CPT

## 2021-01-01 PROCEDURE — B2111ZZ FLUOROSCOPY OF MULTIPLE CORONARY ARTERIES USING LOW OSMOLAR CONTRAST: ICD-10-PCS | Performed by: INTERNAL MEDICINE

## 2021-01-01 PROCEDURE — 94002 VENT MGMT INPAT INIT DAY: CPT

## 2021-01-01 PROCEDURE — 027034Z DILATION OF CORONARY ARTERY, ONE ARTERY WITH DRUG-ELUTING INTRALUMINAL DEVICE, PERCUTANEOUS APPROACH: ICD-10-PCS | Performed by: INTERNAL MEDICINE

## 2021-01-01 PROCEDURE — 3700000000 HC ANESTHESIA ATTENDED CARE: Performed by: ORTHOPAEDIC SURGERY

## 2021-01-01 PROCEDURE — 85027 COMPLETE CBC AUTOMATED: CPT

## 2021-01-01 PROCEDURE — 6370000000 HC RX 637 (ALT 250 FOR IP)

## 2021-01-01 PROCEDURE — 96372 THER/PROPH/DIAG INJ SC/IM: CPT

## 2021-01-01 PROCEDURE — 86900 BLOOD TYPING SEROLOGIC ABO: CPT

## 2021-01-01 PROCEDURE — 85730 THROMBOPLASTIN TIME PARTIAL: CPT

## 2021-01-01 PROCEDURE — 86038 ANTINUCLEAR ANTIBODIES: CPT

## 2021-01-01 PROCEDURE — 82565 ASSAY OF CREATININE: CPT

## 2021-01-01 PROCEDURE — 93458 L HRT ARTERY/VENTRICLE ANGIO: CPT

## 2021-01-01 PROCEDURE — B543ZZA ULTRASONOGRAPHY OF RIGHT JUGULAR VEINS, GUIDANCE: ICD-10-PCS | Performed by: INTERNAL MEDICINE

## 2021-01-01 PROCEDURE — C1894 INTRO/SHEATH, NON-LASER: HCPCS

## 2021-01-01 PROCEDURE — B2151ZZ FLUOROSCOPY OF LEFT HEART USING LOW OSMOLAR CONTRAST: ICD-10-PCS | Performed by: INTERNAL MEDICINE

## 2021-01-01 PROCEDURE — 6360000002 HC RX W HCPCS: Performed by: NURSE ANESTHETIST, CERTIFIED REGISTERED

## 2021-01-01 PROCEDURE — 83516 IMMUNOASSAY NONANTIBODY: CPT

## 2021-01-01 PROCEDURE — 36556 INSERT NON-TUNNEL CV CATH: CPT

## 2021-01-01 PROCEDURE — 0BH17EZ INSERTION OF ENDOTRACHEAL AIRWAY INTO TRACHEA, VIA NATURAL OR ARTIFICIAL OPENING: ICD-10-PCS | Performed by: INTERNAL MEDICINE

## 2021-01-01 DEVICE — SCREW BNE L16MM DIA2.7MM CORT S STL ST T8 STARDRV RECESS: Type: IMPLANTABLE DEVICE | Site: WRIST | Status: FUNCTIONAL

## 2021-01-01 DEVICE — SCREW BNE L20MM DIA2.4MM DST RAD VOLAR S STL ST VAR ANG LOK: Type: IMPLANTABLE DEVICE | Site: WRIST | Status: FUNCTIONAL

## 2021-01-01 DEVICE — SCREW BNE L16MM DIA2.4MM DST RAD VOLAR S STL ST VAR ANG LOK: Type: IMPLANTABLE DEVICE | Site: WRIST | Status: FUNCTIONAL

## 2021-01-01 DEVICE — PLATE BNE W22XL54MM STD 9 H R DST RAD VOLAR S STL VAR ANG: Type: IMPLANTABLE DEVICE | Site: WRIST | Status: FUNCTIONAL

## 2021-01-01 DEVICE — SCREW BNE L14MM DIA2.7MM CORT S STL ST T8 STARDRV RECESS: Type: IMPLANTABLE DEVICE | Site: WRIST | Status: FUNCTIONAL

## 2021-01-01 DEVICE — SCREW BNE L22MM DIA2.4MM DST RAD VOLAR S STL ST VAR ANG LOK: Type: IMPLANTABLE DEVICE | Site: WRIST | Status: FUNCTIONAL

## 2021-01-01 RX ORDER — PANTOPRAZOLE SODIUM 40 MG/10ML
40 INJECTION, POWDER, LYOPHILIZED, FOR SOLUTION INTRAVENOUS DAILY
Status: DISCONTINUED | OUTPATIENT
Start: 2021-01-01 | End: 2021-01-01 | Stop reason: HOSPADM

## 2021-01-01 RX ORDER — HYDROCODONE BITARTRATE AND ACETAMINOPHEN 5; 325 MG/1; MG/1
1 TABLET ORAL EVERY 6 HOURS PRN
Qty: 12 TABLET | Refills: 0 | Status: SHIPPED | OUTPATIENT
Start: 2021-01-01 | End: 2021-01-01

## 2021-01-01 RX ORDER — MORPHINE SULFATE 2 MG/ML
2 INJECTION, SOLUTION INTRAMUSCULAR; INTRAVENOUS EVERY 5 MIN PRN
Status: DISCONTINUED | OUTPATIENT
Start: 2021-01-01 | End: 2021-01-01 | Stop reason: HOSPADM

## 2021-01-01 RX ORDER — IPRATROPIUM BROMIDE AND ALBUTEROL SULFATE 2.5; .5 MG/3ML; MG/3ML
SOLUTION RESPIRATORY (INHALATION)
Status: DISCONTINUED
Start: 2021-01-01 | End: 2021-01-01 | Stop reason: HOSPADM

## 2021-01-01 RX ORDER — IPRATROPIUM BROMIDE AND ALBUTEROL SULFATE 2.5; .5 MG/3ML; MG/3ML
1 SOLUTION RESPIRATORY (INHALATION)
Status: DISCONTINUED | OUTPATIENT
Start: 2021-01-01 | End: 2021-01-01 | Stop reason: HOSPADM

## 2021-01-01 RX ORDER — ALBUTEROL SULFATE 90 UG/1
2 AEROSOL, METERED RESPIRATORY (INHALATION) ONCE
Status: COMPLETED | OUTPATIENT
Start: 2021-01-01 | End: 2021-01-01

## 2021-01-01 RX ORDER — SODIUM CHLORIDE 0.9 % (FLUSH) 0.9 %
5-40 SYRINGE (ML) INJECTION PRN
Status: DISCONTINUED | OUTPATIENT
Start: 2021-01-01 | End: 2021-01-01 | Stop reason: HOSPADM

## 2021-01-01 RX ORDER — ASPIRIN 81 MG/1
81 TABLET, CHEWABLE ORAL DAILY
Status: DISCONTINUED | OUTPATIENT
Start: 2021-01-01 | End: 2021-01-01 | Stop reason: HOSPADM

## 2021-01-01 RX ORDER — LIDOCAINE HYDROCHLORIDE 10 MG/ML
INJECTION, SOLUTION INFILTRATION; PERINEURAL
Status: COMPLETED | OUTPATIENT
Start: 2021-01-01 | End: 2021-01-01

## 2021-01-01 RX ORDER — FENTANYL CITRATE 50 UG/ML
INJECTION, SOLUTION INTRAMUSCULAR; INTRAVENOUS
Status: COMPLETED | OUTPATIENT
Start: 2021-01-01 | End: 2021-01-01

## 2021-01-01 RX ORDER — MORPHINE SULFATE 4 MG/ML
4 INJECTION, SOLUTION INTRAMUSCULAR; INTRAVENOUS ONCE
Status: COMPLETED | OUTPATIENT
Start: 2021-01-01 | End: 2021-01-01

## 2021-01-01 RX ORDER — MIDAZOLAM HYDROCHLORIDE 1 MG/ML
INJECTION INTRAMUSCULAR; INTRAVENOUS
Status: COMPLETED
Start: 2021-01-01 | End: 2021-01-01

## 2021-01-01 RX ORDER — MORPHINE SULFATE 2 MG/ML
INJECTION, SOLUTION INTRAMUSCULAR; INTRAVENOUS
Status: DISCONTINUED
Start: 2021-01-01 | End: 2021-01-01 | Stop reason: HOSPADM

## 2021-01-01 RX ORDER — LORAZEPAM 2 MG/ML
0.5 INJECTION INTRAMUSCULAR
Status: DISCONTINUED | OUTPATIENT
Start: 2021-01-01 | End: 2021-01-01 | Stop reason: HOSPADM

## 2021-01-01 RX ORDER — PROMETHAZINE HYDROCHLORIDE 25 MG/ML
6.25 INJECTION, SOLUTION INTRAMUSCULAR; INTRAVENOUS
Status: DISCONTINUED | OUTPATIENT
Start: 2021-01-01 | End: 2021-01-01 | Stop reason: HOSPADM

## 2021-01-01 RX ORDER — DEXMEDETOMIDINE HYDROCHLORIDE 4 UG/ML
.2-1.4 INJECTION, SOLUTION INTRAVENOUS CONTINUOUS
Status: DISCONTINUED | OUTPATIENT
Start: 2021-01-01 | End: 2021-01-01 | Stop reason: HOSPADM

## 2021-01-01 RX ORDER — LIDOCAINE HYDROCHLORIDE 10 MG/ML
1 INJECTION, SOLUTION EPIDURAL; INFILTRATION; INTRACAUDAL; PERINEURAL
Status: DISCONTINUED | OUTPATIENT
Start: 2021-01-01 | End: 2021-01-01 | Stop reason: HOSPADM

## 2021-01-01 RX ORDER — FENTANYL CITRATE 50 UG/ML
INJECTION, SOLUTION INTRAMUSCULAR; INTRAVENOUS
Status: COMPLETED
Start: 2021-01-01 | End: 2021-01-01

## 2021-01-01 RX ORDER — METHYLPREDNISOLONE SODIUM SUCCINATE 125 MG/2ML
60 INJECTION, POWDER, LYOPHILIZED, FOR SOLUTION INTRAMUSCULAR; INTRAVENOUS EVERY 8 HOURS
Status: DISCONTINUED | OUTPATIENT
Start: 2021-01-01 | End: 2021-01-01 | Stop reason: HOSPADM

## 2021-01-01 RX ORDER — ACETAMINOPHEN 325 MG/1
650 TABLET ORAL EVERY 6 HOURS PRN
Status: DISCONTINUED | OUTPATIENT
Start: 2021-01-01 | End: 2021-01-01 | Stop reason: HOSPADM

## 2021-01-01 RX ORDER — SODIUM CHLORIDE 9 MG/ML
INJECTION, SOLUTION INTRAVENOUS CONTINUOUS
Status: DISCONTINUED | OUTPATIENT
Start: 2021-01-01 | End: 2021-01-01

## 2021-01-01 RX ORDER — MIDAZOLAM HYDROCHLORIDE 1 MG/ML
2 INJECTION INTRAMUSCULAR; INTRAVENOUS ONCE
Status: COMPLETED | OUTPATIENT
Start: 2021-01-01 | End: 2021-01-01

## 2021-01-01 RX ORDER — MORPHINE SULFATE 2 MG/ML
1 INJECTION, SOLUTION INTRAMUSCULAR; INTRAVENOUS EVERY 5 MIN PRN
Status: DISCONTINUED | OUTPATIENT
Start: 2021-01-01 | End: 2021-01-01 | Stop reason: HOSPADM

## 2021-01-01 RX ORDER — HEPARIN SODIUM 1000 [USP'U]/ML
INJECTION, SOLUTION INTRAVENOUS; SUBCUTANEOUS
Status: COMPLETED | OUTPATIENT
Start: 2021-01-01 | End: 2021-01-01

## 2021-01-01 RX ORDER — OXYCODONE HYDROCHLORIDE AND ACETAMINOPHEN 5; 325 MG/1; MG/1
2 TABLET ORAL PRN
Status: DISCONTINUED | OUTPATIENT
Start: 2021-01-01 | End: 2021-01-01 | Stop reason: HOSPADM

## 2021-01-01 RX ORDER — SODIUM CHLORIDE 9 MG/ML
25 INJECTION, SOLUTION INTRAVENOUS PRN
Status: DISCONTINUED | OUTPATIENT
Start: 2021-01-01 | End: 2021-01-01 | Stop reason: HOSPADM

## 2021-01-01 RX ORDER — 0.9 % SODIUM CHLORIDE 0.9 %
1000 INTRAVENOUS SOLUTION INTRAVENOUS ONCE
Status: COMPLETED | OUTPATIENT
Start: 2021-01-01 | End: 2021-01-01

## 2021-01-01 RX ORDER — FENTANYL CITRATE 50 UG/ML
INJECTION, SOLUTION INTRAMUSCULAR; INTRAVENOUS PRN
Status: DISCONTINUED | OUTPATIENT
Start: 2021-01-01 | End: 2021-01-01 | Stop reason: SDUPTHER

## 2021-01-01 RX ORDER — PROPOFOL 10 MG/ML
5-50 INJECTION, EMULSION INTRAVENOUS
Status: DISCONTINUED | OUTPATIENT
Start: 2021-01-01 | End: 2021-01-01

## 2021-01-01 RX ORDER — LABETALOL HYDROCHLORIDE 5 MG/ML
5 INJECTION, SOLUTION INTRAVENOUS EVERY 10 MIN PRN
Status: DISCONTINUED | OUTPATIENT
Start: 2021-01-01 | End: 2021-01-01 | Stop reason: HOSPADM

## 2021-01-01 RX ORDER — IPRATROPIUM BROMIDE AND ALBUTEROL SULFATE 2.5; .5 MG/3ML; MG/3ML
1 SOLUTION RESPIRATORY (INHALATION) PRN
Status: DISCONTINUED | OUTPATIENT
Start: 2021-01-01 | End: 2021-01-01 | Stop reason: HOSPADM

## 2021-01-01 RX ORDER — DIPHENHYDRAMINE HCL 25 MG
50 CAPSULE ORAL EVERY 6 HOURS PRN
COMMUNITY

## 2021-01-01 RX ORDER — SODIUM CHLORIDE 0.9 % (FLUSH) 0.9 %
5-40 SYRINGE (ML) INJECTION EVERY 12 HOURS SCHEDULED
Status: DISCONTINUED | OUTPATIENT
Start: 2021-01-01 | End: 2021-01-01 | Stop reason: HOSPADM

## 2021-01-01 RX ORDER — CHLORHEXIDINE GLUCONATE 0.12 MG/ML
15 RINSE ORAL 2 TIMES DAILY
Status: DISCONTINUED | OUTPATIENT
Start: 2021-01-01 | End: 2021-01-01 | Stop reason: HOSPADM

## 2021-01-01 RX ORDER — MIDAZOLAM HYDROCHLORIDE 5 MG/ML
INJECTION INTRAMUSCULAR; INTRAVENOUS
Status: COMPLETED | OUTPATIENT
Start: 2021-01-01 | End: 2021-01-01

## 2021-01-01 RX ORDER — LIDOCAINE HYDROCHLORIDE 20 MG/ML
INJECTION, SOLUTION INFILTRATION; PERINEURAL PRN
Status: DISCONTINUED | OUTPATIENT
Start: 2021-01-01 | End: 2021-01-01 | Stop reason: SDUPTHER

## 2021-01-01 RX ORDER — MAGNESIUM HYDROXIDE 1200 MG/15ML
LIQUID ORAL CONTINUOUS PRN
Status: COMPLETED | OUTPATIENT
Start: 2021-01-01 | End: 2021-01-01

## 2021-01-01 RX ORDER — DIPHENHYDRAMINE HYDROCHLORIDE 50 MG/ML
12.5 INJECTION INTRAMUSCULAR; INTRAVENOUS
Status: DISCONTINUED | OUTPATIENT
Start: 2021-01-01 | End: 2021-01-01 | Stop reason: HOSPADM

## 2021-01-01 RX ORDER — ONDANSETRON 4 MG/1
4 TABLET, ORALLY DISINTEGRATING ORAL EVERY 8 HOURS PRN
Status: DISCONTINUED | OUTPATIENT
Start: 2021-01-01 | End: 2021-01-01 | Stop reason: HOSPADM

## 2021-01-01 RX ORDER — PROPOFOL 10 MG/ML
5-50 INJECTION, EMULSION INTRAVENOUS
Status: DISCONTINUED | OUTPATIENT
Start: 2021-01-01 | End: 2021-01-01 | Stop reason: HOSPADM

## 2021-01-01 RX ORDER — IPRATROPIUM BROMIDE AND ALBUTEROL SULFATE 2.5; .5 MG/3ML; MG/3ML
1 SOLUTION RESPIRATORY (INHALATION)
Status: DISCONTINUED | OUTPATIENT
Start: 2021-01-01 | End: 2021-01-01

## 2021-01-01 RX ORDER — ONDANSETRON 2 MG/ML
4 INJECTION INTRAMUSCULAR; INTRAVENOUS EVERY 6 HOURS PRN
Status: DISCONTINUED | OUTPATIENT
Start: 2021-01-01 | End: 2021-01-01 | Stop reason: HOSPADM

## 2021-01-01 RX ORDER — MAGNESIUM SULFATE 1 G/100ML
1000 INJECTION INTRAVENOUS PRN
Status: DISCONTINUED | OUTPATIENT
Start: 2021-01-01 | End: 2021-01-01 | Stop reason: HOSPADM

## 2021-01-01 RX ORDER — SODIUM CHLORIDE 0.9 % (FLUSH) 0.9 %
10 SYRINGE (ML) INJECTION EVERY 12 HOURS SCHEDULED
Status: DISCONTINUED | OUTPATIENT
Start: 2021-01-01 | End: 2021-01-01 | Stop reason: HOSPADM

## 2021-01-01 RX ORDER — MIDAZOLAM HYDROCHLORIDE 1 MG/ML
INJECTION INTRAMUSCULAR; INTRAVENOUS PRN
Status: DISCONTINUED | OUTPATIENT
Start: 2021-01-01 | End: 2021-01-01 | Stop reason: SDUPTHER

## 2021-01-01 RX ORDER — SODIUM CHLORIDE 0.9 % (FLUSH) 0.9 %
10 SYRINGE (ML) INJECTION PRN
Status: DISCONTINUED | OUTPATIENT
Start: 2021-01-01 | End: 2021-01-01 | Stop reason: HOSPADM

## 2021-01-01 RX ORDER — EPTIFIBATIDE 0.75 MG/ML
1 INJECTION, SOLUTION INTRAVENOUS CONTINUOUS
Status: ACTIVE | OUTPATIENT
Start: 2021-01-01 | End: 2021-01-01

## 2021-01-01 RX ORDER — DEXTROSE MONOHYDRATE 25 G/50ML
INJECTION, SOLUTION INTRAVENOUS
Status: COMPLETED
Start: 2021-01-01 | End: 2021-01-01

## 2021-01-01 RX ORDER — OXYCODONE HYDROCHLORIDE AND ACETAMINOPHEN 5; 325 MG/1; MG/1
1-2 TABLET ORAL EVERY 6 HOURS PRN
Qty: 30 TABLET | Refills: 0 | Status: SHIPPED | OUTPATIENT
Start: 2021-01-01 | End: 2021-01-01

## 2021-01-01 RX ORDER — MEPERIDINE HYDROCHLORIDE 50 MG/ML
12.5 INJECTION INTRAMUSCULAR; INTRAVENOUS; SUBCUTANEOUS EVERY 5 MIN PRN
Status: DISCONTINUED | OUTPATIENT
Start: 2021-01-01 | End: 2021-01-01 | Stop reason: HOSPADM

## 2021-01-01 RX ORDER — POLYETHYLENE GLYCOL 3350 17 G/17G
17 POWDER, FOR SOLUTION ORAL DAILY PRN
Status: DISCONTINUED | OUTPATIENT
Start: 2021-01-01 | End: 2021-01-01 | Stop reason: HOSPADM

## 2021-01-01 RX ORDER — SODIUM CHLORIDE 9 MG/ML
INJECTION, SOLUTION INTRAVENOUS PRN
Status: DISCONTINUED | OUTPATIENT
Start: 2021-01-01 | End: 2021-01-01 | Stop reason: HOSPADM

## 2021-01-01 RX ORDER — SODIUM CHLORIDE, SODIUM LACTATE, POTASSIUM CHLORIDE, CALCIUM CHLORIDE 600; 310; 30; 20 MG/100ML; MG/100ML; MG/100ML; MG/100ML
INJECTION, SOLUTION INTRAVENOUS CONTINUOUS
Status: DISCONTINUED | OUTPATIENT
Start: 2021-01-01 | End: 2021-01-01 | Stop reason: HOSPADM

## 2021-01-01 RX ORDER — METHYLPREDNISOLONE SODIUM SUCCINATE 125 MG/2ML
125 INJECTION, POWDER, LYOPHILIZED, FOR SOLUTION INTRAMUSCULAR; INTRAVENOUS ONCE
Status: COMPLETED | OUTPATIENT
Start: 2021-01-01 | End: 2021-01-01

## 2021-01-01 RX ORDER — MIDAZOLAM HYDROCHLORIDE 1 MG/ML
INJECTION INTRAMUSCULAR; INTRAVENOUS
Status: DISPENSED
Start: 2021-01-01 | End: 2021-01-01

## 2021-01-01 RX ORDER — METHYLPREDNISOLONE SODIUM SUCCINATE 40 MG/ML
40 INJECTION, POWDER, LYOPHILIZED, FOR SOLUTION INTRAMUSCULAR; INTRAVENOUS ONCE
Status: COMPLETED | OUTPATIENT
Start: 2021-01-01 | End: 2021-01-01

## 2021-01-01 RX ORDER — DEXAMETHASONE SODIUM PHOSPHATE 10 MG/ML
INJECTION INTRAMUSCULAR; INTRAVENOUS PRN
Status: DISCONTINUED | OUTPATIENT
Start: 2021-01-01 | End: 2021-01-01 | Stop reason: SDUPTHER

## 2021-01-01 RX ORDER — LORAZEPAM 2 MG/ML
INJECTION INTRAMUSCULAR
Status: DISCONTINUED
Start: 2021-01-01 | End: 2021-01-01 | Stop reason: HOSPADM

## 2021-01-01 RX ORDER — CARBOXYMETHYLCELLULOSE SODIUM 10 MG/ML
1 GEL OPHTHALMIC
Status: DISCONTINUED | OUTPATIENT
Start: 2021-01-01 | End: 2021-01-01 | Stop reason: HOSPADM

## 2021-01-01 RX ORDER — POTASSIUM CHLORIDE 29.8 MG/ML
20 INJECTION INTRAVENOUS PRN
Status: DISCONTINUED | OUTPATIENT
Start: 2021-01-01 | End: 2021-01-01 | Stop reason: HOSPADM

## 2021-01-01 RX ORDER — ASPIRIN 81 MG/1
TABLET, CHEWABLE ORAL
Status: COMPLETED | OUTPATIENT
Start: 2021-01-01 | End: 2021-01-01

## 2021-01-01 RX ORDER — MORPHINE SULFATE 2 MG/ML
2 INJECTION, SOLUTION INTRAMUSCULAR; INTRAVENOUS
Status: DISCONTINUED | OUTPATIENT
Start: 2021-01-01 | End: 2021-01-01 | Stop reason: HOSPADM

## 2021-01-01 RX ORDER — ONDANSETRON 2 MG/ML
4 INJECTION INTRAMUSCULAR; INTRAVENOUS PRN
Status: DISCONTINUED | OUTPATIENT
Start: 2021-01-01 | End: 2021-01-01 | Stop reason: HOSPADM

## 2021-01-01 RX ORDER — CALCIUM GLUCONATE 20 MG/ML
1000 INJECTION, SOLUTION INTRAVENOUS PRN
Status: DISCONTINUED | OUTPATIENT
Start: 2021-01-01 | End: 2021-01-01 | Stop reason: HOSPADM

## 2021-01-01 RX ORDER — FENTANYL CITRATE 50 UG/ML
25 INJECTION, SOLUTION INTRAMUSCULAR; INTRAVENOUS
Status: DISCONTINUED | OUTPATIENT
Start: 2021-01-01 | End: 2021-01-01 | Stop reason: HOSPADM

## 2021-01-01 RX ORDER — PROPOFOL 10 MG/ML
INJECTION, EMULSION INTRAVENOUS PRN
Status: DISCONTINUED | OUTPATIENT
Start: 2021-01-01 | End: 2021-01-01 | Stop reason: SDUPTHER

## 2021-01-01 RX ORDER — HYDRALAZINE HYDROCHLORIDE 20 MG/ML
5 INJECTION INTRAMUSCULAR; INTRAVENOUS EVERY 10 MIN PRN
Status: DISCONTINUED | OUTPATIENT
Start: 2021-01-01 | End: 2021-01-01 | Stop reason: HOSPADM

## 2021-01-01 RX ORDER — 0.9 % SODIUM CHLORIDE 0.9 %
500 INTRAVENOUS SOLUTION INTRAVENOUS ONCE
Status: COMPLETED | OUTPATIENT
Start: 2021-01-01 | End: 2021-01-01

## 2021-01-01 RX ORDER — DEXTROSE MONOHYDRATE 25 G/50ML
12.5 INJECTION, SOLUTION INTRAVENOUS PRN
Status: DISCONTINUED | OUTPATIENT
Start: 2021-01-01 | End: 2021-01-01 | Stop reason: HOSPADM

## 2021-01-01 RX ORDER — ACETAMINOPHEN 650 MG/1
650 SUPPOSITORY RECTAL EVERY 6 HOURS PRN
Status: DISCONTINUED | OUTPATIENT
Start: 2021-01-01 | End: 2021-01-01 | Stop reason: HOSPADM

## 2021-01-01 RX ORDER — ROSUVASTATIN CALCIUM 10 MG/1
40 TABLET, COATED ORAL NIGHTLY
Status: DISCONTINUED | OUTPATIENT
Start: 2021-01-01 | End: 2021-01-01 | Stop reason: HOSPADM

## 2021-01-01 RX ORDER — OXYCODONE HYDROCHLORIDE AND ACETAMINOPHEN 5; 325 MG/1; MG/1
1 TABLET ORAL PRN
Status: DISCONTINUED | OUTPATIENT
Start: 2021-01-01 | End: 2021-01-01 | Stop reason: HOSPADM

## 2021-01-01 RX ORDER — ALBUMIN, HUMAN INJ 5% 5 %
25 SOLUTION INTRAVENOUS ONCE
Status: COMPLETED | OUTPATIENT
Start: 2021-01-01 | End: 2021-01-01

## 2021-01-01 RX ORDER — PHENYLEPHRINE HCL IN 0.9% NACL 1 MG/10 ML
SYRINGE (ML) INTRAVENOUS PRN
Status: DISCONTINUED | OUTPATIENT
Start: 2021-01-01 | End: 2021-01-01 | Stop reason: SDUPTHER

## 2021-01-01 RX ADMIN — PHENYLEPHRINE HYDROCHLORIDE 300 MCG/MIN: 10 INJECTION INTRAVENOUS at 03:24

## 2021-01-01 RX ADMIN — VANCOMYCIN HYDROCHLORIDE 1500 MG: 10 INJECTION, POWDER, LYOPHILIZED, FOR SOLUTION INTRAVENOUS at 10:37

## 2021-01-01 RX ADMIN — ALBUMIN (HUMAN) 25 G: 12.5 INJECTION, SOLUTION INTRAVENOUS at 02:52

## 2021-01-01 RX ADMIN — Medication: at 01:51

## 2021-01-01 RX ADMIN — METOPROLOL TARTRATE 25 MG: 25 TABLET, FILM COATED ORAL at 22:09

## 2021-01-01 RX ADMIN — Medication: at 07:02

## 2021-01-01 RX ADMIN — ASPIRIN 81 MG: 81 TABLET, CHEWABLE ORAL at 09:46

## 2021-01-01 RX ADMIN — Medication 100 MCG: at 14:35

## 2021-01-01 RX ADMIN — MUPIROCIN: 20 OINTMENT TOPICAL at 09:54

## 2021-01-01 RX ADMIN — Medication 100 MCG: at 14:21

## 2021-01-01 RX ADMIN — MUPIROCIN: 20 OINTMENT TOPICAL at 09:46

## 2021-01-01 RX ADMIN — ROSUVASTATIN CALCIUM 40 MG: 10 TABLET, FILM COATED ORAL at 21:22

## 2021-01-01 RX ADMIN — DEXTROSE MONOHYDRATE 12.5 G: 25 INJECTION, SOLUTION INTRAVENOUS at 08:09

## 2021-01-01 RX ADMIN — FENTANYL CITRATE: 50 INJECTION, SOLUTION INTRAMUSCULAR; INTRAVENOUS at 16:04

## 2021-01-01 RX ADMIN — Medication: at 23:19

## 2021-01-01 RX ADMIN — SODIUM CHLORIDE, PRESERVATIVE FREE 10 ML: 5 INJECTION INTRAVENOUS at 10:07

## 2021-01-01 RX ADMIN — MIDAZOLAM HYDROCHLORIDE 1 MG: 5 INJECTION INTRAMUSCULAR; INTRAVENOUS at 14:19

## 2021-01-01 RX ADMIN — SODIUM CHLORIDE 1000 ML: 9 INJECTION, SOLUTION INTRAVENOUS at 16:03

## 2021-01-01 RX ADMIN — MIDAZOLAM 2 MG: 1 INJECTION INTRAMUSCULAR; INTRAVENOUS at 16:32

## 2021-01-01 RX ADMIN — Medication: at 21:14

## 2021-01-01 RX ADMIN — SODIUM CHLORIDE: 9 INJECTION, SOLUTION INTRAVENOUS at 15:59

## 2021-01-01 RX ADMIN — Medication 50 MEQ: at 14:28

## 2021-01-01 RX ADMIN — CARBOXYMETHYLCELLULOSE SODIUM 1 DROP: 10 GEL OPHTHALMIC at 09:47

## 2021-01-01 RX ADMIN — CEFTRIAXONE SODIUM 1000 MG: 1 INJECTION, POWDER, FOR SOLUTION INTRAMUSCULAR; INTRAVENOUS at 10:04

## 2021-01-01 RX ADMIN — FENTANYL CITRATE 150 MCG/HR: 50 INJECTION, SOLUTION INTRAMUSCULAR; INTRAVENOUS at 22:00

## 2021-01-01 RX ADMIN — VASOPRESSIN 0.04 UNITS/MIN: 20 INJECTION INTRAVENOUS at 23:47

## 2021-01-01 RX ADMIN — FENTANYL CITRATE 200 MCG/HR: 50 INJECTION, SOLUTION INTRAMUSCULAR; INTRAVENOUS at 03:40

## 2021-01-01 RX ADMIN — DEXTROSE MONOHYDRATE 100 MCG/MIN: 50 INJECTION, SOLUTION INTRAVENOUS at 11:52

## 2021-01-01 RX ADMIN — TICAGRELOR 90 MG: 90 TABLET ORAL at 09:46

## 2021-01-01 RX ADMIN — DEXTROSE MONOHYDRATE 12.5 G: 25 INJECTION, SOLUTION INTRAVENOUS at 19:55

## 2021-01-01 RX ADMIN — ASPIRIN 324 MG: 81 TABLET, CHEWABLE ORAL at 14:49

## 2021-01-01 RX ADMIN — PHENYLEPHRINE HYDROCHLORIDE 10 MCG/MIN: 10 INJECTION INTRAVENOUS at 15:26

## 2021-01-01 RX ADMIN — CARBOXYMETHYLCELLULOSE SODIUM 1 DROP: 10 GEL OPHTHALMIC at 16:32

## 2021-01-01 RX ADMIN — SODIUM BICARBONATE 50 MEQ: 84 INJECTION, SOLUTION INTRAVENOUS at 02:47

## 2021-01-01 RX ADMIN — VASOPRESSIN 0.03 UNITS/MIN: 20 INJECTION INTRAVENOUS at 14:52

## 2021-01-01 RX ADMIN — FENTANYL CITRATE 50 MCG: 50 INJECTION INTRAMUSCULAR; INTRAVENOUS at 14:46

## 2021-01-01 RX ADMIN — Medication 10 ML: at 09:47

## 2021-01-01 RX ADMIN — DEXTROSE MONOHYDRATE 10 MCG/MIN: 50 INJECTION, SOLUTION INTRAVENOUS at 17:49

## 2021-01-01 RX ADMIN — ROSUVASTATIN CALCIUM 40 MG: 10 TABLET, FILM COATED ORAL at 22:08

## 2021-01-01 RX ADMIN — PHENYLEPHRINE HYDROCHLORIDE 300 MCG/MIN: 10 INJECTION INTRAVENOUS at 00:20

## 2021-01-01 RX ADMIN — MUPIROCIN: 20 OINTMENT TOPICAL at 21:13

## 2021-01-01 RX ADMIN — IPRATROPIUM BROMIDE AND ALBUTEROL SULFATE 1 AMPULE: .5; 2.5 SOLUTION RESPIRATORY (INHALATION) at 15:20

## 2021-01-01 RX ADMIN — Medication 1 MCG/KG/HR: at 18:08

## 2021-01-01 RX ADMIN — METHYLPREDNISOLONE SODIUM SUCCINATE 40 MG: 40 INJECTION, POWDER, LYOPHILIZED, FOR SOLUTION INTRAMUSCULAR; INTRAVENOUS at 16:02

## 2021-01-01 RX ADMIN — CALCIUM GLUCONATE 1000 MG: 20 INJECTION, SOLUTION INTRAVENOUS at 02:15

## 2021-01-01 RX ADMIN — CEFEPIME HYDROCHLORIDE 2000 MG: 2 INJECTION, POWDER, FOR SOLUTION INTRAVENOUS at 09:43

## 2021-01-01 RX ADMIN — ONDANSETRON 4 MG: 2 INJECTION INTRAMUSCULAR; INTRAVENOUS at 14:00

## 2021-01-01 RX ADMIN — PANTOPRAZOLE SODIUM 40 MG: 40 INJECTION, POWDER, FOR SOLUTION INTRAVENOUS at 09:46

## 2021-01-01 RX ADMIN — POTASSIUM CHLORIDE 20 MEQ: 29.8 INJECTION, SOLUTION INTRAVENOUS at 01:16

## 2021-01-01 RX ADMIN — MIDAZOLAM HYDROCHLORIDE 1 MG: 5 INJECTION INTRAMUSCULAR; INTRAVENOUS at 14:01

## 2021-01-01 RX ADMIN — SODIUM BICARBONATE 150 MEQ: 84 INJECTION, SOLUTION INTRAVENOUS at 23:05

## 2021-01-01 RX ADMIN — Medication 15 ML: at 21:15

## 2021-01-01 RX ADMIN — CARBOXYMETHYLCELLULOSE SODIUM 1 DROP: 10 GEL OPHTHALMIC at 09:53

## 2021-01-01 RX ADMIN — METOPROLOL TARTRATE 25 MG: 25 TABLET, FILM COATED ORAL at 09:46

## 2021-01-01 RX ADMIN — Medication 10 ML: at 21:14

## 2021-01-01 RX ADMIN — SODIUM CHLORIDE, POTASSIUM CHLORIDE, SODIUM LACTATE AND CALCIUM CHLORIDE: 600; 310; 30; 20 INJECTION, SOLUTION INTRAVENOUS at 11:59

## 2021-01-01 RX ADMIN — LIDOCAINE HYDROCHLORIDE 100 MG: 20 INJECTION, SOLUTION INFILTRATION; PERINEURAL at 13:58

## 2021-01-01 RX ADMIN — TICAGRELOR 90 MG: 90 TABLET ORAL at 09:53

## 2021-01-01 RX ADMIN — DEXTROSE MONOHYDRATE 12.5 G: 25 INJECTION, SOLUTION INTRAVENOUS at 14:28

## 2021-01-01 RX ADMIN — Medication 2 PUFF: at 11:14

## 2021-01-01 RX ADMIN — SODIUM BICARBONATE: 84 INJECTION, SOLUTION INTRAVENOUS at 03:56

## 2021-01-01 RX ADMIN — DEXTROSE MONOHYDRATE 90 MCG/MIN: 50 INJECTION, SOLUTION INTRAVENOUS at 09:07

## 2021-01-01 RX ADMIN — SODIUM CHLORIDE 1000 ML: 9 INJECTION, SOLUTION INTRAVENOUS at 14:08

## 2021-01-01 RX ADMIN — MORPHINE SULFATE 4 MG: 4 INJECTION, SOLUTION INTRAMUSCULAR; INTRAVENOUS at 17:45

## 2021-01-01 RX ADMIN — MIDAZOLAM HYDROCHLORIDE 2 MG: 2 INJECTION, SOLUTION INTRAMUSCULAR; INTRAVENOUS at 13:55

## 2021-01-01 RX ADMIN — PHENYLEPHRINE HYDROCHLORIDE 300 MCG/MIN: 10 INJECTION INTRAVENOUS at 06:01

## 2021-01-01 RX ADMIN — PROPOFOL 40 MCG/KG/MIN: 10 INJECTION, EMULSION INTRAVENOUS at 18:15

## 2021-01-01 RX ADMIN — Medication: at 21:15

## 2021-01-01 RX ADMIN — ASPIRIN 81 MG: 81 TABLET, CHEWABLE ORAL at 09:53

## 2021-01-01 RX ADMIN — SODIUM CHLORIDE, PRESERVATIVE FREE 10 ML: 5 INJECTION INTRAVENOUS at 21:14

## 2021-01-01 RX ADMIN — SODIUM BICARBONATE 50 MEQ: 84 INJECTION, SOLUTION INTRAVENOUS at 14:28

## 2021-01-01 RX ADMIN — POTASSIUM CHLORIDE 20 MEQ: 29.8 INJECTION, SOLUTION INTRAVENOUS at 02:18

## 2021-01-01 RX ADMIN — DEXAMETHASONE SODIUM PHOSPHATE 8 MG: 10 INJECTION INTRAMUSCULAR; INTRAVENOUS at 14:00

## 2021-01-01 RX ADMIN — MORPHINE SULFATE 2 MG: 2 INJECTION, SOLUTION INTRAMUSCULAR; INTRAVENOUS at 12:15

## 2021-01-01 RX ADMIN — Medication 100 MEQ: at 17:31

## 2021-01-01 RX ADMIN — PHENYLEPHRINE HYDROCHLORIDE 300 MCG/MIN: 10 INJECTION INTRAVENOUS at 15:30

## 2021-01-01 RX ADMIN — IPRATROPIUM BROMIDE AND ALBUTEROL SULFATE 1 AMPULE: .5; 2.5 SOLUTION RESPIRATORY (INHALATION) at 19:56

## 2021-01-01 RX ADMIN — METRONIDAZOLE 500 MG: 500 INJECTION, SOLUTION INTRAVENOUS at 02:13

## 2021-01-01 RX ADMIN — CARBOXYMETHYLCELLULOSE SODIUM 1 DROP: 10 GEL OPHTHALMIC at 11:43

## 2021-01-01 RX ADMIN — EPTIFIBATIDE 1 MCG/KG/MIN: 0.75 INJECTION, SOLUTION INTRAVENOUS at 14:16

## 2021-01-01 RX ADMIN — ACETAMINOPHEN 650 MG: 325 TABLET ORAL at 17:57

## 2021-01-01 RX ADMIN — DEXTROSE MONOHYDRATE 80 MCG/MIN: 50 INJECTION, SOLUTION INTRAVENOUS at 02:59

## 2021-01-01 RX ADMIN — ACETAMINOPHEN 650 MG: 325 TABLET ORAL at 20:26

## 2021-01-01 RX ADMIN — CEFAZOLIN SODIUM 2000 MG: 10 INJECTION, POWDER, FOR SOLUTION INTRAVENOUS at 13:55

## 2021-01-01 RX ADMIN — SODIUM BICARBONATE: 84 INJECTION, SOLUTION INTRAVENOUS at 17:43

## 2021-01-01 RX ADMIN — FENTANYL CITRATE 25 MCG: 50 INJECTION, SOLUTION INTRAMUSCULAR; INTRAVENOUS at 14:01

## 2021-01-01 RX ADMIN — METRONIDAZOLE 500 MG: 500 INJECTION, SOLUTION INTRAVENOUS at 10:36

## 2021-01-01 RX ADMIN — PROPOFOL 200 MG: 10 INJECTION, EMULSION INTRAVENOUS at 13:58

## 2021-01-01 RX ADMIN — PHENYLEPHRINE HYDROCHLORIDE 300 MCG/MIN: 10 INJECTION INTRAVENOUS at 18:08

## 2021-01-01 RX ADMIN — FENTANYL CITRATE 25 MCG: 50 INJECTION, SOLUTION INTRAMUSCULAR; INTRAVENOUS at 14:19

## 2021-01-01 RX ADMIN — VASOPRESSIN 0.03 UNITS/MIN: 20 INJECTION INTRAVENOUS at 08:03

## 2021-01-01 RX ADMIN — Medication 150 MEQ: at 23:05

## 2021-01-01 RX ADMIN — IPRATROPIUM BROMIDE AND ALBUTEROL SULFATE 1 AMPULE: .5; 3 SOLUTION RESPIRATORY (INHALATION) at 12:15

## 2021-01-01 RX ADMIN — Medication 100 MCG: at 14:27

## 2021-01-01 RX ADMIN — IPRATROPIUM BROMIDE AND ALBUTEROL SULFATE 1 AMPULE: .5; 2.5 SOLUTION RESPIRATORY (INHALATION) at 07:46

## 2021-01-01 RX ADMIN — PROPOFOL 50 MCG/KG/MIN: 10 INJECTION, EMULSION INTRAVENOUS at 01:18

## 2021-01-01 RX ADMIN — PANTOPRAZOLE SODIUM 40 MG: 40 INJECTION, POWDER, FOR SOLUTION INTRAVENOUS at 16:36

## 2021-01-01 RX ADMIN — TICAGRELOR 90 MG: 90 TABLET ORAL at 22:08

## 2021-01-01 RX ADMIN — DEXTROSE MONOHYDRATE 90 MCG/MIN: 50 INJECTION, SOLUTION INTRAVENOUS at 06:00

## 2021-01-01 RX ADMIN — Medication 15 ML: at 09:55

## 2021-01-01 RX ADMIN — ACETAMINOPHEN 650 MG: 325 TABLET ORAL at 02:19

## 2021-01-01 RX ADMIN — MIDAZOLAM HYDROCHLORIDE 2 MG: 2 INJECTION, SOLUTION INTRAMUSCULAR; INTRAVENOUS at 12:19

## 2021-01-01 RX ADMIN — FENTANYL CITRATE 100 MCG: 50 INJECTION INTRAMUSCULAR; INTRAVENOUS at 12:19

## 2021-01-01 RX ADMIN — SODIUM CHLORIDE, PRESERVATIVE FREE 10 ML: 5 INJECTION INTRAVENOUS at 22:25

## 2021-01-01 RX ADMIN — CARBOXYMETHYLCELLULOSE SODIUM 1 DROP: 10 GEL OPHTHALMIC at 04:29

## 2021-01-01 RX ADMIN — METHYLPREDNISOLONE SODIUM SUCCINATE 125 MG: 125 INJECTION, POWDER, FOR SOLUTION INTRAMUSCULAR; INTRAVENOUS at 09:33

## 2021-01-01 RX ADMIN — SODIUM BICARBONATE 100 MEQ: 84 INJECTION, SOLUTION INTRAVENOUS at 17:31

## 2021-01-01 RX ADMIN — CALCIUM GLUCONATE 1000 MG: 20 INJECTION, SOLUTION INTRAVENOUS at 10:47

## 2021-01-01 RX ADMIN — PROPOFOL 50 MCG/KG/MIN: 10 INJECTION, EMULSION INTRAVENOUS at 05:36

## 2021-01-01 RX ADMIN — ACETAMINOPHEN 650 MG: 325 TABLET ORAL at 09:46

## 2021-01-01 RX ADMIN — TICAGRELOR 90 MG: 90 TABLET ORAL at 21:22

## 2021-01-01 RX ADMIN — Medication: at 05:28

## 2021-01-01 RX ADMIN — FENTANYL CITRATE 12.5 MCG/HR: 50 INJECTION, SOLUTION INTRAMUSCULAR; INTRAVENOUS at 15:46

## 2021-01-01 RX ADMIN — IPRATROPIUM BROMIDE AND ALBUTEROL SULFATE 1 AMPULE: .5; 2.5 SOLUTION RESPIRATORY (INHALATION) at 12:06

## 2021-01-01 RX ADMIN — LORAZEPAM 0.5 MG: 2 INJECTION INTRAMUSCULAR; INTRAVENOUS at 12:15

## 2021-01-01 RX ADMIN — METRONIDAZOLE 500 MG: 500 INJECTION, SOLUTION INTRAVENOUS at 11:44

## 2021-01-01 RX ADMIN — PANTOPRAZOLE SODIUM 40 MG: 40 INJECTION, POWDER, FOR SOLUTION INTRAVENOUS at 09:57

## 2021-01-01 RX ADMIN — IPRATROPIUM BROMIDE AND ALBUTEROL SULFATE 1 AMPULE: .5; 2.5 SOLUTION RESPIRATORY (INHALATION) at 08:34

## 2021-01-01 RX ADMIN — PHENYLEPHRINE HYDROCHLORIDE 300 MCG/MIN: 10 INJECTION INTRAVENOUS at 21:09

## 2021-01-01 RX ADMIN — PROPOFOL 30 MCG/KG/MIN: 10 INJECTION, EMULSION INTRAVENOUS at 14:53

## 2021-01-01 RX ADMIN — CARBOXYMETHYLCELLULOSE SODIUM 1 DROP: 10 GEL OPHTHALMIC at 23:54

## 2021-01-01 RX ADMIN — Medication 1 MCG/KG/HR: at 06:42

## 2021-01-01 RX ADMIN — Medication 1 MCG/KG/HR: at 23:49

## 2021-01-01 RX ADMIN — METRONIDAZOLE 500 MG: 500 INJECTION, SOLUTION INTRAVENOUS at 17:57

## 2021-01-01 RX ADMIN — Medication 0.6 MCG/KG/HR: at 13:16

## 2021-01-01 RX ADMIN — CARBOXYMETHYLCELLULOSE SODIUM 1 DROP: 10 GEL OPHTHALMIC at 00:02

## 2021-01-01 RX ADMIN — MORPHINE SULFATE 4 MG: 4 INJECTION, SOLUTION INTRAMUSCULAR; INTRAVENOUS at 15:58

## 2021-01-01 RX ADMIN — DEXTROSE MONOHYDRATE 80 MCG/MIN: 50 INJECTION, SOLUTION INTRAVENOUS at 23:37

## 2021-01-01 RX ADMIN — DEXTROSE MONOHYDRATE 12.5 G: 25 INJECTION, SOLUTION INTRAVENOUS at 18:50

## 2021-01-01 RX ADMIN — SODIUM CHLORIDE 500 ML: 9 INJECTION, SOLUTION INTRAVENOUS at 10:07

## 2021-01-01 RX ADMIN — CARBOXYMETHYLCELLULOSE SODIUM 1 DROP: 10 GEL OPHTHALMIC at 21:14

## 2021-01-01 RX ADMIN — LIDOCAINE HYDROCHLORIDE 1 ML: 10 INJECTION, SOLUTION INFILTRATION; PERINEURAL at 12:37

## 2021-01-01 RX ADMIN — CARBOXYMETHYLCELLULOSE SODIUM 1 DROP: 10 GEL OPHTHALMIC at 17:57

## 2021-01-01 RX ADMIN — Medication 10 ML: at 09:55

## 2021-01-01 RX ADMIN — MIDAZOLAM HYDROCHLORIDE 2 MG: 5 INJECTION INTRAMUSCULAR; INTRAVENOUS at 13:22

## 2021-01-01 RX ADMIN — CARBOXYMETHYLCELLULOSE SODIUM 1 DROP: 10 GEL OPHTHALMIC at 03:57

## 2021-01-01 RX ADMIN — PHENYLEPHRINE HYDROCHLORIDE 60 MCG/MIN: 10 INJECTION INTRAVENOUS at 09:42

## 2021-01-01 RX ADMIN — HEPARIN SODIUM 4600 UNITS: 1000 INJECTION, SOLUTION INTRAVENOUS; SUBCUTANEOUS at 13:31

## 2021-01-01 RX ADMIN — SODIUM CHLORIDE, PRESERVATIVE FREE 10 ML: 5 INJECTION INTRAVENOUS at 09:58

## 2021-01-01 RX ADMIN — MIDAZOLAM HYDROCHLORIDE 2 MG: 5 INJECTION INTRAMUSCULAR; INTRAVENOUS at 13:47

## 2021-01-01 RX ADMIN — PHENYLEPHRINE HYDROCHLORIDE 300 MCG/MIN: 10 INJECTION INTRAVENOUS at 09:09

## 2021-01-01 RX ADMIN — Medication 3000 ML/HR: at 11:31

## 2021-01-01 RX ADMIN — FENTANYL CITRATE 100 MCG/HR: 50 INJECTION, SOLUTION INTRAMUSCULAR; INTRAVENOUS at 09:42

## 2021-01-01 RX ADMIN — PROPOFOL 40 MCG/KG/MIN: 10 INJECTION, EMULSION INTRAVENOUS at 19:49

## 2021-01-01 RX ADMIN — Medication 15 ML: at 09:46

## 2021-01-01 ASSESSMENT — PULMONARY FUNCTION TESTS
PIF_VALUE: 43
PIF_VALUE: 18
PIF_VALUE: 18
PIF_VALUE: 34
PIF_VALUE: 42
PIF_VALUE: 10
PIF_VALUE: 8
PIF_VALUE: 18
PIF_VALUE: 12
PIF_VALUE: 3
PIF_VALUE: 18
PIF_VALUE: 12
PIF_VALUE: 42
PIF_VALUE: 18
PIF_VALUE: 18
PIF_VALUE: 3
PIF_VALUE: 12
PIF_VALUE: 12
PIF_VALUE: 42
PIF_VALUE: 3
PIF_VALUE: 3
PIF_VALUE: 43
PIF_VALUE: 9
PIF_VALUE: 9
PIF_VALUE: 20
PIF_VALUE: 1
PIF_VALUE: 2
PIF_VALUE: 40
PIF_VALUE: 11
PIF_VALUE: 3
PIF_VALUE: 2
PIF_VALUE: 9
PIF_VALUE: 3
PIF_VALUE: 9
PIF_VALUE: 43
PIF_VALUE: 11
PIF_VALUE: 43
PIF_VALUE: 18
PIF_VALUE: 23
PIF_VALUE: 26
PIF_VALUE: 3
PIF_VALUE: 43
PIF_VALUE: 12
PIF_VALUE: 20
PIF_VALUE: 17
PIF_VALUE: 18
PIF_VALUE: 11
PIF_VALUE: 8
PIF_VALUE: 40
PIF_VALUE: 11
PIF_VALUE: 46
PIF_VALUE: 11
PIF_VALUE: 44
PIF_VALUE: 14
PIF_VALUE: 31
PIF_VALUE: 17
PIF_VALUE: 8
PIF_VALUE: 18
PIF_VALUE: 3
PIF_VALUE: 8
PIF_VALUE: 2
PIF_VALUE: 18
PIF_VALUE: 8
PIF_VALUE: 28
PIF_VALUE: 47
PIF_VALUE: 3
PIF_VALUE: 42
PIF_VALUE: 3
PIF_VALUE: 18
PIF_VALUE: 12
PIF_VALUE: 15
PIF_VALUE: 18
PIF_VALUE: 10
PIF_VALUE: 10
PIF_VALUE: 3
PIF_VALUE: 9
PIF_VALUE: 24
PIF_VALUE: 41
PIF_VALUE: 18
PIF_VALUE: 1
PIF_VALUE: 18
PIF_VALUE: 3
PIF_VALUE: 18
PIF_VALUE: 42
PIF_VALUE: 8
PIF_VALUE: 44
PIF_VALUE: 9
PIF_VALUE: 36
PIF_VALUE: 2
PIF_VALUE: 37
PIF_VALUE: 38
PIF_VALUE: 2
PIF_VALUE: 18
PIF_VALUE: 18
PIF_VALUE: 45
PIF_VALUE: 45
PIF_VALUE: 11
PIF_VALUE: 3
PIF_VALUE: 18
PIF_VALUE: 3
PIF_VALUE: 36
PIF_VALUE: 18
PIF_VALUE: 38
PIF_VALUE: 9
PIF_VALUE: 3
PIF_VALUE: 38
PIF_VALUE: 19
PIF_VALUE: 2
PIF_VALUE: 18
PIF_VALUE: 36
PIF_VALUE: 9
PIF_VALUE: 19
PIF_VALUE: 2
PIF_VALUE: 43
PIF_VALUE: 10
PIF_VALUE: 20
PIF_VALUE: 2
PIF_VALUE: 14
PIF_VALUE: 43
PIF_VALUE: 3
PIF_VALUE: 43
PIF_VALUE: 3
PIF_VALUE: 16
PIF_VALUE: 3
PIF_VALUE: 4
PIF_VALUE: 5
PIF_VALUE: 42
PIF_VALUE: 18

## 2021-01-01 ASSESSMENT — PAIN SCALES - GENERAL
PAINLEVEL_OUTOF10: 0
PAINLEVEL_OUTOF10: 5
PAINLEVEL_OUTOF10: 5
PAINLEVEL_OUTOF10: 0
PAINLEVEL_OUTOF10: 0
PAINLEVEL_OUTOF10: 9
PAINLEVEL_OUTOF10: 8

## 2021-01-01 ASSESSMENT — PAIN DESCRIPTION - ORIENTATION
ORIENTATION: RIGHT
ORIENTATION: RIGHT

## 2021-01-01 ASSESSMENT — PAIN DESCRIPTION - PAIN TYPE
TYPE: ACUTE PAIN

## 2021-01-01 ASSESSMENT — PAIN DESCRIPTION - DESCRIPTORS: DESCRIPTORS: SHARP;CONSTANT

## 2021-01-01 ASSESSMENT — PAIN DESCRIPTION - LOCATION
LOCATION: WRIST
LOCATION: BACK;CHEST;ARM;LEG

## 2021-01-01 ASSESSMENT — PAIN - FUNCTIONAL ASSESSMENT: PAIN_FUNCTIONAL_ASSESSMENT: 0-10

## 2021-01-01 ASSESSMENT — PAIN DESCRIPTION - FREQUENCY: FREQUENCY: CONTINUOUS

## 2021-03-10 NOTE — ED PROVIDER NOTES
[facility] Emergency Department    CHIEF COMPLAINT  Fall (tripped over grandchild) and Wrist Pain (right wrist pain)      SHARED SERVICE VISIT  Evaluated by JOSE. My supervising physician was available for consultation. HISTORY OF PRESENT ILLNESS  Hussain Faulkner is a 73 y.o. female who presents to the ED complaining of a fall that occurred earlier today. 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  Denies any injury to her head or loss of consciousness. No anticoagulation, nausea or vomiting. States the pain is primarily at her right wrist.  States she is able to move all of her fingers freely. No prior injury to that wrist however she has had injured the left wrist in the past.  No other complaints, modifying factors or associated symptoms. Nursing notes reviewed.    Past Medical History:   Diagnosis Date    Asthma     Hypertension      Past Surgical History:   Procedure Laterality Date     SECTION      X 2    FRACTURE SURGERY Left 2017    ORIF wrist fx/ CTR    WRIST SURGERY      left     Family History   Problem Relation Age of Onset    Diabetes Mother      Social History     Socioeconomic History    Marital status:      Spouse name: Not on file    Number of children: Not on file    Years of education: Not on file    Highest education level: Not on file   Occupational History    Not on file   Social Needs    Financial resource strain: Not on file    Food insecurity     Worry: Not on file     Inability: Not on file   [employer] needs     Medical: Not on file     Non-medical: Not on file   Tobacco Use    Smoking status: Former Smoker    Smokeless tobacco: Never Used   Substance and Sexual Activity    Alcohol use: Yes     Comment: rarely    Drug use: Yes     Types: Marijuana     Comment: last use one week ago    Sexual activity: Not on file Lifestyle    Physical activity     Days per week: Not on file     Minutes per session: Not on file    Stress: Not on file   Relationships    Social connections     Talks on phone: Not on file     Gets together: Not on file     Attends Episcopal service: Not on file     Active member of club or organization: Not on file     Attends meetings of clubs or organizations: Not on file     Relationship status: Not on file    Intimate partner violence     Fear of current or ex partner: Not on file     Emotionally abused: Not on file     Physically abused: Not on file     Forced sexual activity: Not on file   Other Topics Concern    Not on file   Social History Narrative    Not on file     No current facility-administered medications for this encounter. Current Outpatient Medications   Medication Sig Dispense Refill    HYDROcodone-acetaminophen (NORCO) 5-325 MG per tablet Take 1 tablet by mouth every 6 hours as needed for Pain for up to 3 days. Intended supply: 3 days. Take lowest dose possible to manage pain 12 tablet 0    Diphenhydramine-APAP, sleep, (EXCEDRIN PM PO) Take by mouth nightly as needed      ibuprofen (ADVIL;MOTRIN) 800 MG tablet Take 1 tablet by mouth every 8 hours as needed for Pain 60 tablet 3    ondansetron (ZOFRAN) 4 MG tablet Take 1 tablet by mouth every 8 hours as needed for Nausea 30 tablet 2    albuterol sulfate  (90 BASE) MCG/ACT inhaler Inhale 2 puffs into the lungs every 6 hours as needed for Wheezing      loratadine (CLARITIN) 10 MG tablet Take 10 mg by mouth daily      aspirin 325 MG tablet Take 325 mg by mouth daily. Allergies   Allergen Reactions    Bactrim     Penicillins Other (See Comments)     Facial flushing and syncope    Sulfa Antibiotics      Pt states she got sick.     Tetracyclines & Related Hives       REVIEW OF SYSTEMS  10 systems reviewed, pertinent positives per HPI otherwise noted to be negative    PHYSICAL EXAM  BP (!) 180/90   Pulse 98   Temp

## 2021-03-10 NOTE — ED NOTES
Patient's visitor provided with copy of Emergency Department Visitor Restrictions. Instructed to review while waiting to visit with patient. Visitor verbalized understanding. Andres Doe () [phone] waiting in car.         Raoul Barrientos RN  03/10/21 4598

## 2021-03-12 PROBLEM — E78.00 HIGH CHOLESTEROL: Status: ACTIVE | Noted: 2021-01-01

## 2021-03-12 PROBLEM — G47.9 SLEEP DISORDER: Status: ACTIVE | Noted: 2021-01-01

## 2021-03-12 PROBLEM — G43.809 OTHER TYPE OF MIGRAINE: Status: ACTIVE | Noted: 2021-01-01

## 2021-03-12 PROBLEM — G44.229 CHRONIC TENSION-TYPE HEADACHE, NOT INTRACTABLE: Status: ACTIVE | Noted: 2018-04-12

## 2021-03-12 PROBLEM — F41.9 ANXIETY: Status: ACTIVE | Noted: 2021-01-01

## 2021-03-12 NOTE — PATIENT INSTRUCTIONS

## 2021-03-12 NOTE — PROGRESS NOTES
Rebecca Eric    Age 73 y.o.    female    1955    MRN [id number]    3/15/2021  Arrival Time_____________  OR Time____________[address]     Procedure(s):  OPEN REDUCTION INTERNAL FIXATION RIGHT DISTAL RADIUS -BLOCK-                      General    Surgeon(s):  Dima Glenny, MD       Phone [phone] ([place])     [address]  Cell Work  _________________________________________________________________  _________________________________________________________________  _________________________________________________________________  _________________________________________________________________  _________________________________________________________________      PCP _____________________________ Phone_________________       H&P__________________Bringing    Chart            Epic  DOS     Called_______  EKG__________________Bringing    Chart            Epic  DOS     Called_______  LAB__________________ Bringing    Chart            Epic  DOS     Called_______  Cardiac Clearance_______Bringing    Chart            Epic      DOS       Called_______    Cardiologist________________________ Phone___________________________      ? Amish concerns / Waiver on Chart            PAT Communications_____________  ? Pre-op Instructions Given [place]          ______________________________  ? Directions to Surgery Center                          ______________________________  ? Transportation Home_______________      _______________________________  ?  Crutches/Walker__________________        _______________________________      ________Pre-op Orders   _______Transcribed    _______Wt.  ________Pharmacy          _______SCD  ______VTE     ______B[employer]ocker  ________Consent             ________TED Tino Pitcher

## 2021-03-12 NOTE — PROGRESS NOTES
Comments) and Rash     Facial flushing and syncope    Sulfa Antibiotics Rash     Pt states she got sick.        FAMILY HISTORY     Family History   Problem Relation Age of Onset    Diabetes Mother        SOCIAL HISTORY     Social History     Socioeconomic History    Marital status:      Spouse name: None    Number of children: None    Years of education: None    Highest education level: None   Occupational History    None   Social Needs    Financial resource strain: None    Food insecurity     Worry: None     Inability: None    Transportation needs     Medical: None     Non-medical: None   Tobacco Use    Smoking status: Former Smoker    Smokeless tobacco: Never Used   Substance and Sexual Activity    Alcohol use: Yes     Comment: rarely    Drug use: Yes     Types: Marijuana     Comment: last use one week ago    Sexual activity: None   Lifestyle    Physical activity     Days per week: None     Minutes per session: None    Stress: None   Relationships    Social connections     Talks on phone: None     Gets together: None     Attends Jewish service: None     Active member of club or organization: None     Attends meetings of clubs or organizations: None     Relationship status: None    Intimate partner violence     Fear of current or ex partner: None     Emotionally abused: None     Physically abused: None     Forced sexual activity: None   Other Topics Concern    None   Social History Narrative    None       REVIEW OF SYSTEMS   General: no fever, chills, night sweats, anorexia, malaise, fatigue, or weight change  Hematologic:  no unexplained bleeding or bruising  HEENT:   no nasal congestion, rhinorrhea, sore throat, or facial pain  Respiratory:  no cough, dyspnea, or chest pain  Cardiovascular:  no angina, GANN, PND, orthopnea, dependent edema, or palpitations  Gastrointestinal:  no nausea, vomiting, diarrhea, constipation, or abdominal pain  Genitourinary:  no urinary urgency, frequency, dysuria, or hematuria  Musculoskeletal: see HPI  Endocrine:  no heat or cold intolerance and no polyphagia, polydipsia, or polyuria  Skin:  no skin eruptions or changing lesions  Neurologic:  no focal weakness, numbness/tingling, tremor, or severe headache. See HPI. See HPI for pertinent positives. PHYSICAL EXAM   Vital Signs:   Vitals:    03/12/21 0851   Weight: 145 lb (65.8 kg)   Height: 5' 6\" (1.676 m)       General appearance: healthy, alert, no distress  Skin: Skin color, texture, turgor normal. No rashes or lesions  HEENT: atraumatic, normocephalic. PERRL  Respiratory: Unlabored breathing  Lymphatic: No adenopathy   Neuro: Alert and oriented, normal distal sensation, normal bilateral DTRs  Vascular: Normal distal capillary and distal pulses  Muskuloskeletal Exam: Right wrist examination does demonstrate moderate soft tissue swelling and early ecchymosis with mild dinner fork deformity. She is exquisitely tender at the distal radius and ulna. She is neurovascular intact distally    RADIOLOGY   X-rays obtained and reviewed in office:  Views right wrist 3 views    Impression: There is a mildly comminuted fracture of the distal radius and ulna with dorsal angulation of about 45 degrees. Minimal intra-articular extension is noted    IMPRESSION     1. Closed Colles' fracture of right radius, initial encounter         PLAN   I had a lengthy discussion with patient today regarding diagnosis and treatment options and recommendations. Given the angulation and involvement of the distal radius and distal ulna I have recommended we proceed with open reduction and internal fixation. We discussed the risk benefits complications possible outcomes and expected postoperative course. She understands would like to proceed. Will be scheduled for her early next week    FOLLOWUP     Return for first post operative visit.       Patient was instructed on appropriate use of braces, participation in home exercise programs, healthy lifestyle choices and weight loss as appropriate     Nancy Sierra MD

## 2021-03-12 NOTE — PROGRESS NOTES
Preoperative Screening for Elective Surgery/Invasive Procedures While COVID-19 present in the community     Have you had any of the following symptoms? No  o Fever, chills  o Cough  o Shortness of breath  o Muscle aches/pain  o Diarrhea  o Abdominal pain, nausea, vomiting  o Loss or decrease in taste and / or smell   Risk of Exposure  o Have you recently been hospitalized for COVID-19 or flu-like illness, if so when? No  o Recently diagnosed with COVID-19, if so when? No  o Recently tested for COVID-19, if so when? No  o Have you been in close contact with a person or family member who currently has or recently had [address] [address]? If yes, when and in what context? No  o Do you live with anybody who in the last 14 days has had fever, chills, shortness of breath, muscle aches, flu-like illness? No  o Do you have any close contacts or family members who are currently in the hospital for COVID-19 or flu-like illness? No  If yes, assess recent close contact with this person. Indicate if the patient has a positive screen by answering yes to one or more of the above questions. Patients who test positive or screen positive prior to surgery or on the day of surgery should be evaluated in conjunction with the surgeon/proceduralist/anesthesiologist to determine the urgency of the procedure.

## 2021-03-12 NOTE — LETTER
[address] [facility]   DR. ALEC TREVINO     TODAY'S DATE: 3/12/21    PATIENT'S NAME: David Rod    PATIENT'S NUMBER: [id number]    : 1955    PREFERRED PHONE NUMBER: [phone]      WORK PHONE NUMBER: There is no work phone number on file. DIAGNOSIS:   1. Closed Colles' fracture of right radius, initial encounter        PROCEDURE: Open reduction internal fixation right distal radius fracture    SURGEON: Dr. Katy Weber: Urgent    [facility]: [facility]    ADMISSION STATUS: Outpatient/Same Day Surgery    ANESTHESIA: General   Pre-Op Block requested per Anesthesia's Choice     LENGTH OF SURGERY: 1 hour    EQUIPMENT REQUESTED: Synthes variable angle locking distal radius plates      X-RAYS REQUIRED: Mini C-ARM      PCP: No primary care provider on file. H&P TO BE DONE BY THE PCP      CARDIAC CLEARANCE NEEDED: No     ALLERGIES:   Allergies   Allergen Reactions    Bactrim     Demeclocycline Hives    Tetracyclines & Related Hives    Penicillins Other (See Comments) and Rash     Facial flushing and syncope    Sulfa Antibiotics Rash     Pt states she got sick.            POLAR CARE: No VALERIA/SCD: No GAIT TRAINING: No     POST-OP VISIT: 7 Days    OTHER INSTRUCTIONS/REMARKS:     INSURANCE INFORMATION: _________________________ CARD IN MEDIA IN EPIC___  CARD FAXED___    PRE-CERTIFICATION REQUIRED: YES   NO   PER _______________________    SURGERY SCHEDULED BY: ____________________________________    PATIENT CALLED AND CONFIRMED DATE & TIME: ______________________

## 2021-03-12 NOTE — LETTER
[address] [place] [address] [address] [place] [address]  Phone: [phone]  Fax: [phone]    Elizabeth Andrew MD        March 12, 2021     Patient: Donya Floyd   YOB: 1955   Date of Visit: 3/12/2021       To Whom It May Concern: It is my medical opinion that Ryanne Blas having surgery on her Right wrist 3/15/2021, she must have David Dejesus with her 24 hours post op. He needs off 3/15 and 3/16/21. If you have any questions or concerns, please don't hesitate to call.     Sincerely,        Elizabeth Andrew MD

## 2021-03-12 NOTE — LETTER
[facility] CONSENT FOR SURGICAL PROCEDURES     I, Miranda Batista, hereby authorize Dr. Reg Cronin and/or such associates as may be selected by him/her to perform the following operations:   Open reduction internal fixation right distal radius fracture    I hereby acknowledge that in giving this consent the nature and purpose of the procedure, alternative means of therapy, if any, and reasonably known risks, complications, and discomfort have been explained to me by the above named doctor(s). I authorize the above named doctor(s) to carry out an extension or to perform any other procedure that in her judgement is advisable on the basis of findings during the course of said operation upon the above named patient. I am aware that the practice of medicine and surgery is not an exact science and that the possibility and nature of results or complications cannot be anticipated with complete accuracy. I acknowledge that no guarantees, express or implied, have been made as to the results of the above described procedure or any cure. I acknowledge that disclosure of information has been made to me regarding the nature and purpose of the operation together with the reasonably known risks and that all questions I have asked about the procedure have been answered in a satisfactory manner.           Dr. Reg Cronin       __________________________________  Patient/Guardian or Spouse Signature     __________________________________  Witness   3/12/21 9:19 AM

## 2021-03-12 NOTE — PROGRESS NOTES
Obstructive Sleep Apnea (HAIR) Screening     Patient:  Precious Thomas    YOB: 1955      Medical Record #:  [id number]                     Date:  3/12/2021     1. Are you a loud and/or regular snorer? []  Yes       [x] No    2. Have you been observed to gasp or stop breathing during sleep? []  Yes       [x] No    3. Do you feel tired or groggy upon awakening or do you awaken with a headache?           []  Yes       [] No    4. Are you often tired or fatigued during the wake time hours? []  Yes       [] No    5. Do you fall asleep sitting, reading, watching TV or driving? []  Yes       [] No    6. Do you often have problems with memory or concentration? []  Yes       [] No    **If patient's score is ? 3 they are considered high risk for HAIR. An Anesthesia provider will evaluate the patient and develop a plan of care the day of surgery. Note:  If the patient's BMI is more than 35 kg m¯² , has neck circumference > 40 cm, and/or high blood pressure the risk is greater (© American Sleep Apnea Association, 2006).

## 2021-03-15 PROBLEM — S52.531A FRACTURE, COLLES, RIGHT, CLOSED: Status: ACTIVE | Noted: 2017-04-20

## 2021-03-15 NOTE — ANESTHESIA PROCEDURE NOTES
Peripheral Block    Patient location during procedure: pre-op  Staffing  Performed: anesthesiologist   Anesthesiologist: Rosmery Choudhury MD  Preanesthetic Checklist  Completed: patient identified, IV checked, site marked, risks and benefits discussed, surgical consent, monitors and equipment checked, pre-op evaluation, timeout performed, anesthesia consent given, oxygen available and patient being monitored  Peripheral Block  Patient position: sitting  Prep: ChloraPrep  Patient monitoring: cardiac monitor, continuous pulse ox, frequent blood pressure checks and IV access  Block type: Brachial plexus  Laterality: right  Injection technique: single-shot  Guidance: ultrasound guided  Local infiltration: lidocaine  Infiltration strength: 1 %  Dose: 3 mL  Supraclavicular  Provider prep: mask and sterile gloves  Local infiltration: lidocaine  Needle  Needle gauge: 21 G  Needle length: 10 cm  Needle localization: ultrasound guidance  Assessment  Injection assessment: negative aspiration for heme, no paresthesia on injection and local visualized surrounding nerve on ultrasound  Paresthesia pain: none  Slow fractionated injection: yes  Hemodynamics: stable  Additional Notes  Immediately prior to procedure a \"time out\" was called to verify the correct patient, allergies, laterality, procedure and equipment. Time out performed with  RN    Local Anesthetic: 0.5 %  bupivacaine   Amount: 20 ml  in 5 ml increments after negative aspiration each time. Anterior scalene and middle scalene muscles, 1st rib and pleura, brachial plexus (upper, middle and lower trunk) and Subclavian artery are identified, the tip of the needle and the spread of the local anesthetic around the brachial plexus are visualized. The Brachial plexus appeared to be anatomically normal and there were no abnormal pathologically findings seen.          Medications Administered  Lidocaine injection 1%, 1 mL  Reason for block: post-op pain management and at surgeon's request

## 2021-03-15 NOTE — BRIEF OP NOTE
Brief Postoperative Note      Patient: Shweta Payne  YOB: 1955  MRN: [id number]    Date of Procedure: 3/15/2021    Pre-Op Diagnosis: RIGHT DISTAL RADIUS AND ULNA FRACTURES    Post-Op Diagnosis: Same       Procedure(s):  OPEN REDUCTION INTERNAL FIXATION RIGHT DISTAL RADIUS -BLOCK-    Surgeon(s):  Sam St MD    Assistant:  Surgical Assistant: Angela Saunders    Anesthesia: General    Estimated Blood Loss (mL): Minimal    Complications: None    Specimens:   * No specimens in log *    Implants:  * No implants in log *      Drains: * No LDAs found *    Findings: Displaced fractures right distal radius and ulna    Electronically signed by Sam St MD on 3/15/2021 at 2:47 PM

## 2021-03-15 NOTE — PROGRESS NOTES
Block Time Out with Dr Maria Eugenia Pickett      Verified   Correct Pt.   Correct   Correct Procedure  Correct Site  Correct Extremity

## 2021-03-15 NOTE — ANESTHESIA PRE PROCEDURE
Department of Anesthesiology  Preprocedure Note       Name:  Gibson Hurd   Age:  73 y.o.  :  1955                                          MRN:  [id number]         Date:  3/15/2021      Surgeon: Raji Cates):  Elpidio Murrieta MD    Procedure: Procedure(s):  OPEN REDUCTION INTERNAL FIXATION RIGHT DISTAL RADIUS -BLOCK-    Medications prior to admission:   Prior to Admission medications    Medication Sig Start Date End Date Taking? Authorizing Provider   Diphenhydramine-APAP, sleep, (EXCEDRIN PM PO) Take by mouth nightly as needed    Historical Provider, MD   ibuprofen (ADVIL;MOTRIN) 800 MG tablet Take 1 tablet by mouth every 8 hours as needed for Pain 17   Larry Rodriguez MD   albuterol sulfate  (90 BASE) MCG/ACT inhaler Inhale 2 puffs into the lungs every 6 hours as needed for Wheezing    Historical Provider, MD       Current medications:    Current Facility-Administered Medications   Medication Dose Route Frequency Provider Last Rate Last Admin    ceFAZolin (ANCEF) 2000 mg in dextrose 5 % 100 mL IVPB  2,000 mg Intravenous On Call to Santiago Sorto MD        lactated ringers infusion   Intravenous Continuous Randy Linn MD        sodium chloride flush 0.9 % injection 10 mL  10 mL Intravenous 2 times per day Randy Linn MD        sodium chloride flush 0.9 % injection 10 mL  10 mL Intravenous PRN Randy Linn MD        lidocaine PF 1 % injection 1 mL  1 mL Intradermal Once PRN Randy Linn MD           Allergies: Allergies   Allergen Reactions    Bactrim     Demeclocycline Hives    Tetracyclines & Related Hives    Penicillins Other (See Comments) and Rash     Facial flushing and syncope    Sulfa Antibiotics Rash     Pt states she got sick.        Problem List:    Patient Active Problem List   Diagnosis Code    Left carpal tunnel syndrome G56.02    Closed Colles' fracture of left radius S52.532A    Anxiety F41.9    Asthma J45.909    Chronic tension-type headache, not intractable G44.229    Essential hypertension I10    High cholesterol E78.00    Other and unspecified hyperlipidemia E78.5    Other type of migraine G43.809    Sleep disorder G47.9       Past Medical History:        Diagnosis Date    Asthma     Hypertension     past hx       Past Surgical History:        Procedure Laterality Date     SECTION      X 2    FRACTURE SURGERY Left 2017    ORIF wrist fx/ CTR    WRIST SURGERY Left        Social History:    Social History     Tobacco Use    Smoking status: Former Smoker     Quit date: 3/12/2014     Years since quittin.0    Smokeless tobacco: Never Used   Substance Use Topics    Alcohol use: Yes     Comment: 1-2 drinks per year                                Counseling given: Not Answered      Vital Signs (Current):   Vitals:    21 1119   Weight: 145 lb (65.8 kg)   Height: 5' 6\" (1.676 m)                                              BP Readings from Last 3 Encounters:   03/10/21 (!) 180/90   17 138/70   04/15/17 (!) 144/83       NPO Status:                                                                                 BMI:   Wt Readings from Last 3 Encounters:   21 145 lb (65.8 kg)   21 145 lb (65.8 kg)   03/10/21 145 lb (65.8 kg)     Body mass index is 23.4 kg/m².     CBC:   Lab Results   Component Value Date    WBC 8.8 2012    RBC 4.71 2012    HGB 14.2 2012    HCT 41.0 2012    MCV 87.1 2012    RDW 13.5 2012     2012       CMP:   Lab Results   Component Value Date     2012    K 3.7 2012    CL 99 2012    CO2 27 2012    BUN 10 2012    CREATININE 0.8 2012    GFRAA >60 2012    GLUCOSE 135 2012    PROT 7.5 2012    CALCIUM 9.4 2012    BILITOT 0.26 2012    ALKPHOS 71 2012    AST 17 2012    ALT 14 2012       POC Tests: No results for input(s): POCGLU, POCNA, POCK, POCCL, POCBUN, POCHEMO, POCHCT in the last 72 hours. Coags: No results found for: PROTIME, INR, APTT    HCG (If Applicable):   Lab Results   Component Value Date    PREGTESTUR NEGATIVE 2012        ABGs: No results found for: PHART, PO2ART, ZAH2JVH, NYM8AMA, BEART, T9WPSYKB     Type & Screen (If Applicable):  No results found for: LABABO, LABRH    Drug/Infectious Status (If Applicable):  No results found for: HIV, HEPCAB    COVID-19 Screening (If Applicable): No results found for: COVID19        Anesthesia Evaluation  Patient summary reviewed no history of anesthetic complications:   Airway: Mallampati: II  TM distance: >3 FB   Neck ROM: full  Mouth opening: > = 3 FB Dental: normal exam         Pulmonary:Negative Pulmonary ROS and normal exam  breath sounds clear to auscultation  (+) asthma:                            Cardiovascular:Negative CV ROS  Exercise tolerance: good (>4 METS),   (+) hypertension:,         Rhythm: regular  Rate: normal                    Neuro/Psych:   Negative Neuro/Psych ROS  (+) neuromuscular disease:, headaches:,             GI/Hepatic/Renal: Neg GI/Hepatic/Renal ROS            Endo/Other: Negative Endo/Other ROS                    Abdominal:           Vascular: negative vascular ROS. Pre-Operative Diagnosis: Closed Colles' fracture of right radius, initial encounter [S52.531A]    73 y.o.   BMI:  Body mass index is 23.4 kg/m². Vitals:    21 1119   Weight: 145 lb (65.8 kg)   Height: 5' 6\" (1.676 m)       Allergies   Allergen Reactions    Bactrim     Demeclocycline Hives    Tetracyclines & Related Hives    Penicillins Other (See Comments) and Rash     Facial flushing and syncope    Sulfa Antibiotics Rash     Pt states she got sick. Social History     Tobacco Use    Smoking status: Former Smoker     Quit date: 3/12/2014     Years since quittin.0    Smokeless tobacco: Never Used   Substance Use Topics    Alcohol use:  Yes Comment: 1-2 drinks per year       LABS:    CBC  Lab Results   Component Value Date/Time    WBC 8.8 02/22/2012 03:10 PM    HGB 14.2 02/22/2012 03:10 PM    HCT 41.0 02/22/2012 03:10 PM     02/22/2012 03:10 PM     RENAL  Lab Results   Component Value Date/Time     02/22/2012 03:10 PM    K 3.7 02/22/2012 03:10 PM    CL 99 02/22/2012 03:10 PM    CO2 27 02/22/2012 03:10 PM    BUN 10 02/22/2012 03:10 PM    CREATININE 0.8 02/22/2012 03:10 PM    GLUCOSE 135 (H) 02/22/2012 03:10 PM     COAGS  No results found for: PROTIME, INR, APTT          Anesthesia Plan      general     ASA 3     (I discussed with the patient the risks and benefits of regional anesthesia (inlcuding infection, bleeding, damage to nerves and surrounding structures) PIV, General, IV Narcotics, PACU. All questions were answered the patient agrees with the plan and wishes to proceed.)  Induction: intravenous.                           Melchor Ordoñez MD   3/15/2021

## 2021-03-16 NOTE — OP NOTE
Operative Note      Patient: Gibson Hurd  YOB: 1955  MRN: [id number]    Date of Procedure: 3/15/2021    Pre-Op Diagnosis: RIGHT DISTAL RADIUS FRACTURE    Post-Op Diagnosis: Right distal radius and ulna fractures       Procedure(s):  OPEN REDUCTION INTERNAL FIXATION RIGHT DISTAL RADIUS -BLOCK-    Surgeon(s):  Elpidio Murrieta MD    Assistant:   Surgical Assistant: Mark Villa    Anesthesia: General    Estimated Blood Loss (mL): Minimal    Complications: None    Specimens:   * No specimens in log *    Implants:  Implant Name Type Inv. Item Serial No.  Lot No. LRB No. Used Action   PLATE BNE [id number] STD 9 H R DST RAD VOLAR S STL DONNIE ANG  PLATE BNE [id number] STD 9 H R DST RAD VOLAR S STL DONNIE ANG  DE[employer]  Right 1 Implanted   SCREW BNE L16MM DIA2.4MM DST RAD VOLAR S STL ST DONNIE ANG TIMOTHY  SCREW BNE L16MM DIA2.4MM DST RAD VOLAR S STL ST DONNIE ANG TIMOTHY  DEPU[employer]WD  Right 1 Implanted   SCREW BNE L20MM DIA2.4MM DST RAD VOLAR S STL ST DONNIE ANG TIMOTHY  SCREW BNE L20MM DIA2.4MM DST RAD VOLAR S STL ST DONNIE ANG TIMOTHY  DEPU[employer]WD  Right 2 Implanted   SCREW BNE L22MM DIA2.4MM DST RAD VOLAR S STL ST DONNIE ANG TIMOTHY  SCREW BNE L22MM DIA2.4MM DST RAD VOLAR S STL ST DONNIE ANG TIMOTHY  DEPU[employer]WD  Right 2 Implanted   SCREW BNE L14MM DIA2.7MM SILVERIO S STL ST T8 STARDRV RECESS  SCREW BNE L14MM DIA2.7MM SILVERIO S STL ST T8 STARDRV RECESS  DEPUY [employer]-WD  Right 1 Implanted   SCREW BNE L16MM DIA2.7MM SILVERIO S STL ST T8 STARDRV RECESS  SCREW BNE L16MM DIA2.7MM SILVERIO S STL ST T8 STARDRV RECESS  DEPUY [employer]-WD  Right 1 Implanted         Drains: * No LDAs found *    Findings: Displaced extra-articular fracture of the right distal radius and ulna    Detailed Description of Procedure:   Patient is brought to the operating room and left supine on her hospital bed. A radiolucent arm table was placed under the right upper extremity. A tourniquet is placed on the right upper arm.   2 g of Ancef were administered intravenously prior to being the procedure. General anesthetic was administered by the anesthesia department. Right upper extremity was prepped and draped in usual sterile fashion exsanguinated with an Esmarch and the tourniquet inflated 250 mmHg. A volar approach to the distal radius was made with an incision beginning at the distal forearm crease extending proximally for about 8 cm beginning at the distal forearm crease extending proximally in the interval between the radial vascular bundle and the FCR. Incision was deepened through skin subcutaneous tissues and fascia. The radial artery was protected and approach was deepened to the pronator quadratus which was incised at its origin on the volar cortex of the radius and elevated ulnarward exposing the volar cortex of the distal radial metaphysis and the fracture site to the level of the distal radial articular surface. The fracture was found to be extra-articular with 50% dorsal displacement and angulation approximately 50 degrees. An open reduction was performed and the reduction was held with provisional K wire. A mini C arm was draped with a sterile drape moved into position confirming satisfactory reduction of the distal radial fracture. There was a moderate sized dorsal cortical and metaphyseal defect present but the alignment of the articular surface was well reduced. The distal ulna fracture was very distal and undisplaced after reduction of the radius. A 3 hole locking plate from the [employer] variable angle distal radial tray was selected and applied to the volar aspect of the distal radius confirmed to be in good position on C arm and was reduced to the bone with a 2.7 mm cortical screw in the center hole. Locking screws were then inserted in the distal portion of the plate achieving 8 cortices of fixation in the distal radial fragment with a 2.4 locking screws.   Additional locking screws were placed in the proximal arm of the plate. Final radiographs were revealed the fracture to be well reduced with satisfactory position of the hardware. The wound was irrigated and closed with 0 Vicryl in the pronator origin 2-0 Vicryl in subcutaneous tissues and a running 4-0 Monocryl in subcuticular fashion in the skin. Steri-Strips dry sterile dressings and a well-padded volar splint was applied to the upper extremity. The tourniquet was deflated. Patient was awakened and transferred to the recovery room in good condition having tolerated the procedure well.     Electronically signed by Leilani Corley MD on 3/16/2021 at 12:56 PM

## 2021-03-16 NOTE — ANESTHESIA POSTPROCEDURE EVALUATION
Department of Anesthesiology  Postprocedure Note    Patient: Diana Rodrigues  MRN: [id number]  [place]: 1955  Date of evaluation: 3/16/2021  Time:  2:24 PM     Procedure Summary     Date: 03/15/21 Room / Location: [address]    Anesthesia Start: 0075 Anesthesia Stop: 5074    Procedure: OPEN REDUCTION INTERNAL FIXATION RIGHT DISTAL RADIUS -BLOCK- (Right Wrist) Diagnosis:       Closed Colles' fracture of right radius, initial encounter      (RIGHT DISTAL RADIUS FRACTURE)    Surgeons: Elijah Guy MD Responsible Provider: Arnold Pfeiffer MD    Anesthesia Type: general ASA Status: 3          Anesthesia Type: general    Faviola Phase I: Faviola Score: 8    Faviola Phase II: Faviola Score: 10    Last vitals: Reviewed and per EMR flowsheets.        Anesthesia Post Evaluation    Comments: Postoperative Anesthesia Note    Name:    Diana Rodrigues  MRN:      [id number]    Patient Vitals in the past 12 hrs:     LABS:    CBC  Lab Results       Component                Value               Date/Time                  WBC                      8.8                 02/22/2012 03:10 PM        HGB                      14.2                02/22/2012 03:10 PM        HCT                      41.0                02/22/2012 03:10 PM        PLT                      386                 02/22/2012 03:10 PM   RENAL  Lab Results       Component                Value               Date/Time                  NA                       138                 02/22/2012 03:10 PM        K                        3.7                 02/22/2012 03:10 PM        CL                       99                  02/22/2012 03:10 PM        CO2                      27                  02/22/2012 03:10 PM        BUN                      10                  02/22/2012 03:10 PM        CREATININE               0.8                 02/22/2012 03:10 PM        GLUCOSE                  135 (H)             02/22/2012 03:10 PM   COAGS  No results found for: PROTIME, INR, APTT    Intake & Output:  @28VJNJ@    Nausea & Vomiting:  No    Level of Consciousness:  Awake    Pain Assessment:  Adequate analgesia    Anesthesia Complications:  No apparent anesthetic complications    SUMMARY      Vital signs stable  OK to discharge from Stage I post anesthesia care.   Care transferred from Anesthesiology department on discharge from perioperative area

## 2021-03-19 NOTE — TELEPHONE ENCOUNTER
3/19/21 [facility]  - NO PRECERT REQUIRED - PER ONLINE [facility] - REF # [id number] -  MP

## 2021-03-19 NOTE — PROGRESS NOTES
[facility]  Outpatient Progress Note  Margarette Leon MD    Patient Name: Tesfaye Escoto MRN: [id number]   Age: 73 y.o. YOB: 1955   Sex: female      [address] Complaint   Patient presents with    Follow-up     Right wrist orif distal radius sx 3/15/2021 wound check       HISTORY OF PRESENT ILLNESS   Tesfaye Escoto is a 73 y.o. female  The patient presents for follow up on their fracture. 4 days status post open reduction internal fixation right distal radius fracture the patient presents for first postoperative visit without complaints    PAST MEDICAL HISTORY      Past Medical History:   Diagnosis Date    Asthma     Hypertension     past hx       PAST SURGICAL HISTORY     Past Surgical History:   Procedure Laterality Date     SECTION      X 2    FRACTURE SURGERY Left 2017    ORIF wrist fx/ CTR    WRIST FRACTURE SURGERY Right 3/15/2021    OPEN REDUCTION INTERNAL FIXATION RIGHT DISTAL RADIUS -BLOCK- performed by Grant Rodriguez MD at [address]        MEDICATIONS     Current Outpatient Medications   Medication Sig Dispense Refill    oxyCODONE-acetaminophen (PERCOCET) 5-325 MG per tablet Take 1-2 tablets by mouth every 6 hours as needed for Pain for up to 7 days. Wean off within 7 days of surgery. 30 tablet 0    diphenhydrAMINE (BENADRYL) 25 MG capsule Take 50 mg by mouth every 6 hours as needed for Itching      Diphenhydramine-APAP, sleep, (EXCEDRIN PM PO) Take by mouth nightly as needed      ibuprofen (ADVIL;MOTRIN) 800 MG tablet Take 1 tablet by mouth every 8 hours as needed for Pain 60 tablet 3    albuterol sulfate  (90 BASE) MCG/ACT inhaler Inhale 2 puffs into the lungs every 6 hours as needed for Wheezing       No current facility-administered medications for this visit.         ALLERGIES     Allergies   Allergen Reactions    Bactrim     Demeclocycline Hives    Tetracyclines & Related Hives    Penicillins Other (See Comments) and Rash     Facial flushing and syncope    Sulfa Antibiotics Rash     Pt states she got sick.        FAMILY HISTORY     Family History   Problem Relation Age of Onset    Diabetes Mother     Heart Disease Father        SOCIAL HISTORY     Social History     Socioeconomic History    Marital status:      Spouse name: Not on file    Number of children: Not on file    Years of education: Not on file    Highest education level: Not on file   Occupational History    Not on file   Social Needs    Financial resource strain: Not on file    Food insecurity     Worry: Not on file     Inability: Not on file    Transportation needs     Medical: Not on file     Non-medical: Not on file   Tobacco Use    Smoking status: Former Smoker     Quit date: 3/12/2014     Years since quittin.0    Smokeless tobacco: Never Used   Substance and Sexual Activity    Alcohol use: Yes     Comment: 1-2 drinks per year    Drug use: Yes     Frequency: 7.0 times per week     Types: Marijuana     Comment: last time yesterday    Sexual activity: Not on file   Lifestyle    Physical activity     Days per week: Not on file     Minutes per session: Not on file    Stress: Not on file   Relationships    Social connections     Talks on phone: Not on file     Gets together: Not on file     Attends [employer] service: Not on file     Active member of club or organization: Not on file     Attends meetings of clubs or organizations: Not on file     Relationship status: Not on file    Intimate partner violence     Fear of current or ex partner: Not on file     Emotionally abused: Not on file     Physically abused: Not on file     Forced sexual activity: Not on file   Other Topics Concern    Not on file   Social History Narrative    Not on file       REVIEW OF SYSTEMS   General: no fever, chills, night sweats, anorexia, malaise, fatigue, or weight change  Hematologic:  no unexplained bleeding or none

## 2021-03-19 NOTE — PATIENT INSTRUCTIONS
Patient Education        Wrist Fracture: Rehab Exercises  Introduction  Here are some examples of exercises for you to try. The exercises may be suggested for a condition or for rehabilitation. Start each exercise slowly. Ease off the exercises if you start to have pain. You will be told when to start these exercises and which ones will work best for you. How to do the exercises  Wrist flexion and extension   1. Place your forearm on a table, with your hand and affected wrist extended beyond the table, palm down. 2. Bend your wrist to move your hand upward and allow your hand to close into a fist, then lower your hand and allow your fingers to relax. Hold each position for about 6 seconds. 3. Repeat 8 to 12 times. Hand flips   1. While seated, place your forearm and affected wrist on your thigh, palm down. 2. Flip your hand over so the back of your hand rests on your thigh and your palm is up. Alternate between palm up and palm down while keeping your forearm on your thigh. 3. Repeat 8 to 12 times. Wrist radial and ulnar deviation   1. Hold your affected hand out in front of you, palm down. 2. Slowly bend your wrist as far as you can from side to side. Hold each position for about 6 seconds. 3. Repeat 8 to 12 times. Wrist extensor stretch   1. Extend the arm with the affected wrist in front of you and point your fingers toward the floor. 2. With your other hand, gently bend your wrist farther until you feel a mild to moderate stretch in your forearm. 3. Hold the stretch for at least 15 to 30 seconds. 4. Repeat 2 to 4 times. 5. When you can do this stretch with ease and no pain, repeat steps 1 through 4. But this time extend your affected arm in front of you and make a fist with your palm facing down. Then bend your wrist, pointing your fist toward the floor. Wrist flexor stretch   1. Extend the arm with the affected wrist in front of you with your palm facing away from your body.   2. [place] back your wrist, pointing your hand up toward the ceiling. 3. With your other hand, gently bend your wrist farther until you feel a mild to moderate stretch in your forearm. 4. Hold the stretch for at least 15 to 30 seconds. 5. Repeat 2 to 4 times. 6. Repeat steps 1 through 5, but this time extend your affected arm in front of you with your palm facing up. Then bend back your wrist, pointing your hand toward the floor. Intrinsic flexion   1. Rest the hand with the affected wrist on a table and bend the large joints where your fingers connect to your hand. Keep your thumb and the other joints in your fingers straight. 2. Slowly straighten your fingers. Your wrist should be relaxed, following the line of your fingers and thumb. 3. Move back to your starting position, with your hand bent. 4. Repeat 8 to 12 times. MP extension   1. Place your good hand on a table, palm up. Put the hand with the affected wrist on top of your good hand with your fingers wrapped around the thumb of your good hand like you are making a fist.  2. Slowly uncurl the joints of the hand with the affected wrist where your fingers connect to your hand so that only the top two joints of your fingers are bent. Your fingers will look like a hook. Hold the position for about 6 seconds. 3. Move back to your starting position, with your fingers wrapped around your good thumb. 4. Repeat 8 to 12 times. Follow-up care is a key part of your treatment and safety. Be sure to make and go to all appointments, and call your doctor if you are having problems. It's also a good idea to know your test results and keep a list of the medicines you take. Where can you learn more? Go to https://[employer] and sign in to your [employer] account. Enter B618 in the [employer] box to learn more about \"Wrist Fracture: Rehab Exercises. \"     If you do not have an account, please click on the \"Sign Up Now\" link.   Current as

## 2021-04-07 NOTE — PROGRESS NOTES
Postop Progress Note    Subjective    Danni Gordon presents to the office 4 weeks  following right wrist open reduction and internal fixation. Eating a regular diet without difficulty. Patient reports wound healing well. The patient is not having any pain. Objective    There were no vitals filed for this visit. General: alert, cooperative and no distress  Incision: healing well, no drainage, no erythema, no swelling    XRay of the right wrist, 2views, show reduction well maintained    Assessment    Doing well postoperatively. Plan   1. Continue any current medications  2. Wound care discussed  3. Wound/Incision: healing well  4. Disposition:   Pt is to increase activities as tolerated. May return to light duty immediately with the following restrictions: lifting/carrying not to exceed 5 lbs. .  5. Diet: regular diet  6. Follow up: 6 week.            Electronically signed by Waqar De MD on 4/7/2021 at 9:03 AM

## 2021-12-07 PROBLEM — I46.9 CARDIAC ARREST (HCC): Status: ACTIVE | Noted: 2021-01-01

## 2021-12-07 NOTE — ED PROVIDER NOTES
I independently performed a history and physical on [employer]. All diagnostic, treatment, and disposition decisions were made by myself in conjunction with the advanced practice provider. For further details of [facility] emergency department encounter, please see Narinder Morales NP's documentation. Patient is a 70-year-old female presenting today due to reportedly feeling weak over the last few days. There is initial story that she was feeling weak yesterday. I was asked to see the patient initially due to concern for generalized weakness with some shortness of breath and she was reportedly hypoxic on arrival but improved initially with nasal cannula per MUNIRA Alston. There were multiple critical patients in the ED which we were caring for today. I was evaluating those patients including another severely critical when there was a overhead alert with concern for arrhythmia with possibility of ventricular fibrillation at 1231. At this time, nurses along with myself and MUNIRA Huynh ran to the room and started CPR immediately. Respiratory was called and we also started the patient on bag-valve-mask at that time. She did have poor IV access after multiple attempts and therefore an IO was placed in the right leg for access and at that time 1 mg epinephrine was given. We also had to get a crash cart based on the room she was then and therefore we we initially continued CPR while getting all the equipment established. She also was intubated during this time. By the time the pads were placed, she appeared to be in torsades and therefore ultimately we did try 1 unsynchronized cardioversion with 200 J and she remained in torsades. Following this, we did give 1 mg IV magnesium but she still appeared to have significant tachyarrhythmia in the 200s and therefore we did give 1 additional unsynchronized cardioversion.   She did have a pulse back following this and therefore we ultimately only gave 150 mg bolus amiodarone due to concern for possible ventricular tachycardia with a pulse while discussing this with Dr. Deena Hauser. Due to concern for what was initial ventricular fibrillation, cardiology did feel that it was in the patient's best interest to go to cardiac catheterization lab. When we did get a pulse back, she did start moving all extremities and was attempting to pull her ET tube out. She also seemed to be voicing seeing things to us although it was hard to comprehend what she was saying with the ET tube in place but it did appear that she did have decent mentation at this point. We ultimately did need to sedate her with Versed along with fentanyl to make her more comfortable. She was initially hypotensive and therefore required multiple pushes of push dose epinephrine along with IV fluids and cardiology also recommended Dileep-Synephrine due to concern for tachyarrhythmia and she also received some push doses of this as well. Her blood pressure did improve prior to going to cardiac catheterization lab. The total time of CPR was 7 minutes prior to getting a pulse back although this is a rough estimate. I did ultimately speak to her  for 5 minutes to inform him that his wife had gone through cardiac arrest and was currently in the cardiac catheterization lab. He did report that 2 days ago she had generalized weakness while shopping but states yesterday she was feeling much better and then today she woke up and was feeling ill again with some chills and ultimately decided that she needed to go to the emergency department. He denied that she complained of any chest pain or shortness of breath and denied that she had any vomiting or fever.       Physical:   Gen: Unresponsive initially  Psych: Noncommunicative  HEENT: Pupils 4 mm bilaterally with minimal response  Neck: supple  Cardiac: no pulse initially palpated   Lungs: no spontaneous respirations initially but while doing CPR, it did appear that she did start to breath sporadically on her own   Abdomen: soft and nontender with no R/D/G  Neuro: GCS = 3  Skin: cool to touch     The Ekg interpreted by me shows  sinus tachycardia, byuk=397   Axis is   Normal  QTc is  borderline prolonged QT  Intervals and Durations are unremarkable. ST Segments: no acute change and nonspecific changes  No significant change from prior EKG dated - 2/22/12  No STEMI, one beat shows possible Wellen's wave in V3 but baseline was poor in this beat and therefore less likely, I did discuss this with Dr. David Zhang and he reported no STEMI concerns and less likely Wellen's wave based initial interpretation but just in case, he will plan to take the patient to cath lab       Critical Care Time:  Time: 60 minutes  Includes repeat examinations, speaking with consultants, lab interpretation, charting, treating patient s/p cardiac arrest with ROSC needing vasopressors and cardiac arrhythmia management  Excludes separate billable procedures. Patient at risk for serious decompensation if not treated for this life-threatening condition. Cardiopulmonary resuscitation was completed for 7 minutes. MDM: Patient was initially evaluated due to generalized weakness along with concern for hypoxia. Initially I was told that she would need to be admitted per NP Uma Jessica but she was stable and IV access was being attempted by nursing staff. Shortly after that is a very critical other patient did come in and ultimately we had to shift our attention to this other patient therefore I was unable to go into the room immediately since she was initially overall stable per NP Gamaliel. When preparing to go see her after the other patient was stabilized, I was told overhead that there was concern for possible ventricular fibrillation in her room and ultimately we were ran there and started CPR immediately.   We did obtain return of spontaneous circulation and she ultimately did go to the cardiac catheterization lab.  Her status was critical and guarded at time of admission but it was reassuring that she did appear to have decent mentation with good neurological function following the brief cardiac arrest but I did inform her  that at this point she is still critical and we will need time to determine if there is any true hypoxic brain injury or not from the cardiac arrest.  He seemed appreciative of this conversation.       Alicia Purcell MD  12/07/21 0239

## 2021-12-07 NOTE — PROGRESS NOTES
1231 - Staff noticed rhythm change on monitor, 's. Patient unresponsive, no pulses detected. CPR initiated. 1234 - IO established in right tibia by Peri Dixon RN  1235 - 1mg Epi given by Gayathri Wharton  1236 - Pulse check showing Vfib on monitor, defibrillated at 200J.  1239 - Pulse check showing torsades, .   1239 - Successful intubation by Lee Ann Gun NP.   1240 - 1gram Magnesium given via IO.  1241 - Pulse check, wide complex rhythm noted. 1242 - 150mg Amiodarone given. 1242 - Glucose 87  1246 - 20mcg push-dose Epi given. 1248 - 2mg Versed given, patient bilateral wrist restrained while NP placed femoral CVC. 1250 - 40mcg push dose Epi given. 1252 - 2mg Versed given. 1252 - Right femoral CVC placed by Lee Ann Gun. 1252 - 200mcg Neosynephrine given  1255 - Bedside echo done by Dr. Dylon Ray, anterior wall down - severely reduced EF.  1259 - 40mcg push dose Epi given. 1300 - 300mcg Neosynephrine given. 1306 - 100mcg Fentanyl given. 1308 - Patient transported to cath lab with Dr. Dylon Ray and cath lab RNs at bedside.

## 2021-12-07 NOTE — PROGRESS NOTES
12/07/21 1525   Vent Information   $Ventilation $Initial Day   Skin Assessment Clean, dry, & intact   Vent Type 980   Vent Mode AC/VC+   Vt Ordered 400 mL   Rate Set 16 bmp   FiO2  60 %   SpO2 (!) 88 %   SpO2/FiO2 ratio 146.67   Sensitivity 3   PEEP/CPAP 5   I Time/ I Time % 0.7 s   Humidification Source HME   Vent Patient Data   Peak Inspiratory Pressure 17 cmH2O   Mean Airway Pressure 9 cmH20   Rate Measured 27 br/min   Vt Exhaled 394 mL   Minute Volume 12 Liters   I:E Ratio 1:2   Patient Transport   Time spent transporting 31-45   Transport ventillation type Transport vent   Transport from ER   Transport destination Interventional Radiology   Transport destination Interventional Radiology   Transport destination ICU   Emergency equipment included Yes   Cough/Sputum   Sputum How Obtained Endotracheal; Oral   Cough Congested; Productive   Sputum Amount Moderate   Sputum Color Brown   Spontaneous Breathing Trial (SBT) RT Doc   Pulse 115   Breath Sounds   Right Upper Lobe Clear   Right Middle Lobe Diminished   Right Lower Lobe Diminished   Left Upper Lobe Clear   Left Lower Lobe Diminished   Additional Respiratory  Assessments   Resp 29   Position Supine   Oral Care Mouth suctioned   Alarm Settings   High Pressure Alarm 45 cmH2O   Low Minute Volume Alarm 2 L/min   High Respiratory Rate 40 br/min   Low Exhaled Vt  200 mL   ambu/sx mask at hob

## 2021-12-07 NOTE — ED NOTES
Bed: 07  Expected date:   Expected time:   Means of arrival:   Comments:  fran Lee RN  12/07/21 1028

## 2021-12-07 NOTE — H&P
Hospital Medicine History & Physical      PCP: No primary care provider on file. Date of Admission: 2021    Date of Service: Pt seen/examined on 21 and Admitted to Inpatient with expected LOS greater than two midnights due to medical therapy. Chief Complaint:  maliase      History Of Present Illness:   Per emr as pt intubated/had cardiac arrest in ER    73 y.o. female with hx of htn, asthma who presented to [facility] with ongoing malaise. Pt had been using her inhaler of late due to ongoing SOB, per . Per ER staff, EMS was called and found pt to have hypoxia in 70s however pt was on room air when evaluated in ER. She was placed on oxygen and shortly after went into cardiac arrest.  Cardiology was present and upon ROSC, there was concern for STEMI, so pt was taken for LHC. Pt was transferred to ICU for further care. Past Medical History:          Diagnosis Date    Asthma     Hypertension     past hx       Past Surgical History:          Procedure Laterality Date     SECTION      X 2    FRACTURE SURGERY Left 2017    ORIF wrist fx/ [facility]    WRIST FRACTURE SURGERY Right 3/15/2021    OPEN REDUCTION INTERNAL FIXATION RIGHT DISTAL RADIUS -BLOCK- performed by Anneliese Lovett MD at [facility] Left        Medications Prior to Admission:      Prior to Admission medications    Medication Sig Start Date End Date Taking?  Authorizing Provider   diphenhydrAMINE (BENADRYL) 25 MG capsule Take 50 mg by mouth every 6 hours as needed for Itching    Historical Provider, MD   Diphenhydramine-APAP, sleep, (EXCEDRIN PM PO) Take by mouth nightly as needed    Historical Provider, MD   ibuprofen (ADVIL;MOTRIN) 800 MG tablet Take 1 tablet by mouth every 8 hours as needed for Pain 17   Leandra Oneal MD   albuterol sulfate  (90 BASE) MCG/ACT inhaler Inhale 2 puffs into the lungs every 6 hours as needed for Wheezing    Historical Provider, MD Allergies:  Bactrim, Demeclocycline, Tetracyclines & related, Penicillins, and Sulfa antibiotics    Social History:      The patient currently lives at home    TOBACCO:   reports that she quit smoking about 7 years ago. She has never used smokeless tobacco.  ETOH:   reports current alcohol use. E-Cigarettes/Vaping Use     Questions Responses    E-Cigarette/Vaping Use     Start Date     Passive Exposure     Quit Date     Counseling Given     Comments             Family History:       Reviewed in detail and negative for DM, CAD, Cancer, CVA. Positive as follows:        Problem Relation Age of Onset    Diabetes Mother     Heart Disease Father        REVIEW OF SYSTEMS COMPLETED:   Pertinent positives as noted in the HPI. All other systems reviewed and negative. PHYSICAL EXAM PERFORMED:    /88   Pulse 130   Temp 97.8 °F (36.6 °C) (Oral)   Resp 20   Ht 5' 6\" (1.676 m)   Wt 145 lb (65.8 kg)   SpO2 95%   BMI 23.40 kg/m²     General appearance:  No apparent distress, appears stated age and cooperative. Intubated/sedated  HEENT:  Normal cephalic, atraumatic without obvious deformity. Pupils equal, round, and reactive to light. Extra ocular muscles intact. Conjunctivae/corneas clear. Neck: Supple, with full range of motion. No jugular venous distention. Trachea midline. Respiratory:  Normal respiratory effort. Clear to auscultation, bilaterally without Rales/Wheezes/Rhonchi. Cardiovascular:  Regular rate and rhythm with normal S1/S2 without murmurs, rubs or gallops. Abdomen: Soft, non-tender, non-distended with normal bowel sounds. Musculoskeletal:  No clubbing, cyanosis or edema bilaterally. Full range of motion without deformity. Skin: Skin color, texture, turgor normal.  No rashes or lesions. Neurologic:  Neurovascularly intact without any focal sensory/motor deficits.  Cranial nerves: II-XII intact, grossly non-focal.  Psychiatric:  Alert and oriented c0, sedated/intubated  Capillary Refill: Brisk,3 seconds, normal  Peripheral Pulses: +2 palpable, equal bilaterally       Labs:     Recent Labs     12/07/21  1309 12/07/21  1330   WBC 21.2*  --    HGB 11.9* 12.7   HCT 36.9  --      --      Recent Labs     12/07/21  1309 12/07/21  1330   *  --    K 3.7  --    CL 87*  --    CO2 15*  --    BUN 63*  --    CREATININE 2.0* 2.6*   CALCIUM 8.5  --      Recent Labs     12/07/21  1309   *   *   BILITOT <0.2   ALKPHOS 123     No results for input(s): INR in the last 72 hours. Recent Labs     12/07/21  1309   TROPONINI <0.01       Urinalysis:      Lab Results   Component Value Date    BLOODU NEG 02/22/2012    SPECGRAV 1.030 02/22/2012    GLUCOSEU NEG 02/22/2012       Radiology:       EKG:  I have reviewed the EKG with the following interpretation: sinus, rate of 134, lead reversal?     XR CHEST PORTABLE   Final Result   No acute process. Endotracheal tube in satisfactory position above the ashlee         XR CHEST PORTABLE   Final Result   No evidence of acute cardiopulmonary process.              ASSESSMENT:    Active Hospital Problems    Diagnosis Date Noted    Cardiac arrest [facility]) [I46.9] 12/07/2021    Coronary artery disease involving native coronary artery of native heart with angina pectoris ([facility] [address] [I25.119]     Ischemic cardiomyopathy [I25.5]          PLAN:    Cardiac arrest- witnessed in ER, with ROSC, concern for stemi upon recovery  -cards on board, taken to [address] [place] [address]  -ICU care  -femoral CVC placed 12/7 in ER  -neurochecks ordered    Acute resp failure- due to above, intubated 12/7  -pulm/cc consulted, apprec mgmt of vent  -on sedation    Hyponatremia/AG metabolic acidosis/HARLEEN- ?due to above  -nephro consulted, apprec mgmt    HTN- held all home meds    CAD- per emr    Asthma--on prn albuterol at home  -duonebs prn ordered here    GI ppx: iv protonix  DVT Prophylaxis: renal dose lovenox  Diet: NPO  Code Status:FULL    PT/OT Eval Status: not possible    Dispo - icu

## 2021-12-07 NOTE — CONSULTS
[facility]  [phone]      Attending Physician: Shilpi Harris MD  Reason for Consultation/Chief Complaint: cardaic arrest    Subjective   History of Present Illness:  Digna Hernandez is a 73 y.o. patient who presented to the hospital with complaints of \"not feeling well\". Patient presented by EMS stating she did not feel well for the last few days and is using her inhalers frequently throughout the day. Not able to obtain history given patient's critical status was my understanding that she was in the ER when she underwent a sudden ventricular tachycardic arrest.  She had several rounds of CPR and was shocked x2. She did have torsades at one point. She was treated with magnesium, epi, elijah as well as other meds that are documented ER note. She did get a pulse back and was hypotensive blood pressure did respond epinephrine pushes. Neurologically patient was moving all fours in a purposeful movement to try and take her tube out. Addendum, after the case spoke to the patient's  who stated she denied any chest pain or pressure but had been \"just not feeling well the last couple days. Did deny any fevers but she did have nausea, no vomiting. No cough but just generally very poor not able to elaborate further. Dated she has been using her inhalers quite frequently throughout the day for the last several days without benefit        Past Medical History:   has a past medical history of Asthma and Hypertension. Surgical History:   has a past surgical history that includes  section; fracture surgery (Left, 2017); Wrist surgery (Left); and Wrist fracture surgery (Right, 3/15/2021). Social History:   reports that she quit smoking about 7 years ago. She has never used smokeless tobacco. She reports current alcohol use. She reports current drug use. Frequency: 7.00 times per week. Drug: Marijuana Chynamayte Hackett).      Family History:  family history includes Diabetes in her mother; Heart Disease in her father. Home Medications:  Were reviewed and are listed in nursing record and/or below  Prior to Admission medications    Medication Sig Start Date End Date Taking? Authorizing Provider   diphenhydrAMINE (BENADRYL) 25 MG capsule Take 50 mg by mouth every 6 hours as needed for Itching    Historical Provider, MD   Diphenhydramine-APAP, sleep, (EXCEDRIN PM PO) Take by mouth nightly as needed    Historical Provider, MD   ibuprofen (ADVIL;MOTRIN) 800 MG tablet Take 1 tablet by mouth every 8 hours as needed for Pain 4/24/17   Trip Joyner MD   albuterol sulfate  (90 BASE) MCG/ACT inhaler Inhale 2 puffs into the lungs every 6 hours as needed for Wheezing    Historical Provider, MD        CURRENT Medications:  methylPREDNISolone sodium (SOLU-MEDROL) injection 40 mg, Once  0.9 % sodium chloride bolus, Once  phenylephrine (SHAE-SYNEPHRINE) 50 mg in dextrose 5 % 250 mL infusion, Continuous  fentaNYL (SUBLIMAZE) 100 MCG/2ML injection,     eptifibatide (INTEGRILIN) 0.75 mg/mL infusion, Continuous  propofol injection, Titrated        Allergies:  Bactrim, Demeclocycline, Tetracyclines & related, Penicillins, and Sulfa antibiotics     Review of Systems:   A 14 point review of symptoms completed. Pertinent positives identified in the HPI, all other review of symptoms negative as below.       Objective   PHYSICAL EXAM:    Vitals:    12/07/21 1115   BP:    Pulse:    Resp: 20   Temp:    SpO2: 95%    Weight: 145 lb (65.8 kg)         General Appearance:   Unconscious, , no distress, appears stated age   Head:  Normocephalic, without obvious abnormality, atraumatic   Eyes:  PERRL, conjunctiva/corneas clear   Nose: Nares normal, no drainage or sinus tenderness   Throat: Lips, mucosa, and tongue normal   Neck: Supple, symmetrical, trachea midline, no adenopathy, thyroid: not enlarged, symmetric, no tenderness/mass/nodules, no carotid bruit or JVD   Lungs:   Clear to auscultation bilaterally, respirations unlabored   Chest Wall:  No deformity or tenderness   Heart:  Regular rate and rhythm, S1, S2 normal, no murmur, rub or gallop   Abdomen:   Soft, non-tender, bowel sounds active all four quadrants,  no masses, no organomegaly   Extremities: Extremities normal, atraumatic, no cyanosis or edema   Pulses: 2+ and symmetric   Skin: Skin color, texture, turgor normal, no rashes or lesions   Pysch:  Unable to assess   Neurologic:  Just resuscitated from cardiac arrest but she is moving appropriately to grab at her ET tube and fight staff. Labs   CBC:   Lab Results   Component Value Date    WBC 21.2 12/07/2021    RBC 4.02 12/07/2021    HGB 12.7 12/07/2021    HCT 36.9 12/07/2021    MCV 91.9 12/07/2021    RDW 13.5 12/07/2021     12/07/2021     CMP:  Lab Results   Component Value Date     12/07/2021    K 3.7 12/07/2021    CL 87 12/07/2021    CO2 15 12/07/2021    BUN 63 12/07/2021    CREATININE 2.6 12/07/2021    CREATININE 2.0 12/07/2021    GFRAA 22 12/07/2021    GFRAA >60 02/22/2012    AGRATIO 0.8 12/07/2021    LABGLOM 18 12/07/2021    GLUCOSE 167 12/07/2021    PROT 5.5 12/07/2021    PROT 7.5 02/22/2012    CALCIUM 8.5 12/07/2021    BILITOT <0.2 12/07/2021    ALKPHOS 123 12/07/2021     12/07/2021     12/07/2021     PT/INR:  No results found for: PTINR  HgBA1c:No results found for: LABA1C  Lab Results   Component Value Date    TROPONINI <0.01 12/07/2021         Cardiac Data     Last EKG:     Echo:    Stress Test:    Cath:    Studies:     Assessment and Plan      1. Cardiac arrest  2. CAD  3. Ischemic cardiomyopathy   -Severe reduced EF with anterior hypokinesis    PLAN  1. Emergent left heart cath  . Cardiology Consult ([id number]) Total critical care time was 68 minutes, excluding separately reportable procedures.  Services, included in critical care time were chart data review, documentation time, obtaining info from patient's , review of nursing notes, and vital sign assessment and management of the patient. Helping the emergency room during active cardiac resuscitation. There was a high probability of clinically significant life-threatening deterioration in the patient's condition, which required my urgent intervention. Patient Active Problem List   Diagnosis    Left carpal tunnel syndrome    Fracture, [place], right, closed    Anxiety    Asthma    Chronic tension-type headache, not intractable    Essential hypertension    High cholesterol    Other and unspecified hyperlipidemia    Other type of migraine    Sleep disorder    Cardiac arrest ([facility] [address]    Coronary artery disease involving native coronary artery of native heart with angina pectoris ([facility] [address]    Ischemic cardiomyopathy           Thank you for allowing us to participate in the care of [facility]. Please call me with any questions [phone]. Merline Arrow, MD, [address] [facility] Cardiologist  [facility]  [phone] [facility]  [phone] [address] [place] [address]  12/7/2021 2:58 PM    I will address the patient's cardiac risk factors and adjusted pharmacologic treatment as needed. In addition, I have reinforced the need for patient directed risk factor modification. All questions and concerns were addressed to the patient/family. Alternatives to my treatment were discussed. The note was completed using EMR. Every effort was made to ensure accuracy; however, inadvertent computerized transcription errors may be present.

## 2021-12-07 NOTE — ED PROVIDER NOTES
EMERGENCY DEPARTMENT ENCOUNTER      This patient was seen and evaluated by the attending physician. Pt Name: Verenice Jiménez  MRN: [id number]  [place] 1955  Date of evaluation: 2021  Provider: KASHMIR Nicolas  PCP: No primary care provider on file. ED Attending: Dr. Karyn Dubon. History provided by the patient. CHIEF COMPLAINT:     Chief Complaint   Patient presents with    Shortness of Breath     EMS states patient room air O2 70-80s, improved with nasal cannula. not felt well since yesterday. HISTORY OF PRESENT ILLNESS:      Verenice Jiménez is a 73 y.o. female who presents to [address] [place] [address] with complaints of generally not feeling well. Patient states that she just has not felt well for the last day or so. Patient denied any chest pain or shortness of breath she states that she just felt achy all over, she denied any specific shortness of breath. Patient apparently was hypoxic per EMS in the 70s to 80s, they did have difficulty finding her oxygen saturation here with a good Pleth however nursing did state that they were able to get it and it looked like she was in the high 90s. Patient did not appear acutely distressed she is resting comfortably in the bed she denied any focal chest pain just complained of generalized body aches. [place]  Severity:9  Duration:today  Modifying factors:none noted    Nursing Notes were reviewed     REVIEW OF SYSTEMS:     Review of Systems  All systems, atotal of 10, are reviewed and negative except for those that were just noted in history present illness.         PAST MEDICAL HISTORY:     Past Medical History:   Diagnosis Date    Asthma     Hypertension     past hx         SURGICAL HISTORY:      Past Surgical History:   Procedure Laterality Date     SECTION      X 2    FRACTURE SURGERY Left 2017    ORIF wrist fx/ CTR    WRIST FRACTURE SURGERY Right 3/15/2021    OPEN REDUCTION INTERNAL FIXATION RIGHT DISTAL RADIUS -BLOCK- performed by Ja Yu MD at [address] Left          CURRENT MEDICATIONS:       Current Discharge Medication List      CONTINUE these medications which have NOT CHANGED    Details   diphenhydrAMINE (BENADRYL) 25 MG capsule Take 50 mg by mouth every 6 hours as needed for Itching      Diphenhydramine-APAP, sleep, (EXCEDRIN PM PO) Take by mouth nightly as needed      ibuprofen (ADVIL;MOTRIN) 800 MG tablet Take 1 tablet by mouth every 8 hours as needed for Pain  Qty: 60 tablet, Refills: 3      albuterol sulfate  (90 BASE) MCG/ACT inhaler Inhale 2 puffs into the lungs every 6 hours as needed for Wheezing               ALLERGIES:    Bactrim, Demeclocycline, Tetracyclines & related, Penicillins, and Sulfa antibiotics    FAMILY HISTORY:       Family History   Problem Relation Age of Onset    Diabetes Mother     Heart Disease Father           SOCIAL HISTORY:       Social History     Socioeconomic History    Marital status:      Spouse name: None    Number of children: None    Years of education: None    Highest education level: None   Occupational History    None   Tobacco Use    Smoking status: Former Smoker     Quit date: 3/12/2014     Years since quittin.7    Smokeless tobacco: Never Used   Substance and Sexual Activity    Alcohol use: Yes     Comment: 1-2 drinks per year    Drug use: Yes     Frequency: 7.0 times per week     Types: Marijuana (Weed)     Comment: last time yesterday    Sexual activity: None   Other Topics Concern    None   Social History Narrative    None     Social Determinants of Health     Financial Resource Strain:     Difficulty of Paying Living Expenses: Not on file   Food Insecurity:     Worried About Running Out of Food in the Last Year: Not on file    Edith of Food in the Last Year: Not on file   Transportation Needs:     Lack of Transportation (Medical):  Not on file    Lack of Transportation (Non-Medical): Not on file   Physical Activity:     Days of Exercise per Week: Not on file    Minutes of Exercise per Session: Not on file   Stress:     Feeling of Stress : Not on file   Social Connections:     Frequency of Communication with Friends and Family: Not on file    Frequency of Social Gatherings with Friends and Family: Not on file    Attends Scientologist Services: Not on file    Active Member of [address] [place] [address] or Organizations: Not on file    Attends Club or Organization Meetings: Not on file    Marital Status: Not on file   Intimate Partner Violence:     Fear of Current or Ex-Partner: Not on file    Emotionally Abused: Not on file    Physically Abused: Not on file    Sexually Abused: Not on file   Housing Stability:     Unable to Pay for Housing in the Last Year: Not on file    Number of [place] in the Last Year: Not on file    Unstable Housing in the Last Year: Not on file       SCREENINGS:            PHYSICAL EXAM:       ED Triage Vitals   BP Temp Temp Source Pulse Resp SpO2 Height Weight   12/07/21 1032 12/07/21 1032 12/07/21 1032 12/07/21 1032 12/07/21 1115 12/07/21 1115 12/07/21 1032 12/07/21 1032   109/88 97.8 °F (36.6 °C) Oral 130 20 95 % 5' 6\" (1.676 m) 145 lb (65.8 kg)       Physical Exam    CONSTITUTIONAL: Awake and alert. Cooperative. Well-developed. Well-nourished. Vitals:    12/07/21 1032 12/07/21 1115 12/07/21 1525 12/07/21 1528   BP: 109/88      Pulse: 130  115 115   Resp:  20 29 30   Temp: 97.8 °F (36.6 °C)      TempSrc: Oral      SpO2:  95% (!) 88%    Weight: 145 lb (65.8 kg)      Height: 5' 6\" (1.676 m)        HENT: Normocephalic. Atraumatic. External ears normal, without discharge. TMs clear bilaterally. No nasal discharge. Oropharynx clear, no erythema. Mucous membranes moist.  EYES: Conjunctiva non-injected, no lid abnormalities noted. No scleral icterus. PERRL. EOM's grossly intact. Anterior chambers clear. NECK: Supple. Normal ROM. No meningismus.  No thyroid tenderness or swelling noted. CARDIOVASCULAR: tachycardia. No Murmer. PULMONARY/CHEST WALL: Effort normal. No tachypnea. Lungs clear to ausculation. ABDOMEN: Normal BS. Soft. Nondistended. No tenderness to palpate. No guarding. No hernias noted. No splenomegaly. Back: Spine is midline. No ecchymosis. No crepitus on palpation. No obvious subluxation of vertebral column. No saddle anesthesia or evidence of cauda equina. /ANORECTAL: Not assessed  MUSKULOSKELETAL: Normal ROM. No acute deformities. No edema. No tenderness to palpate. SKIN: Warm and dry. NEUROLOGICAL:  GCS 15. CN II-XII grossly intact. Strength is 5/5 in allextremities and sensation is intact. PSYCHIATRIC: Normal affect, normal insight and judgement. Alert andoriented x 3.         DIAGNOSTIC RESULTS:     LABS:    Results for orders placed or performed during the hospital encounter of 12/07/21   SARS-CoV-2 NAAT (Rapid)    Specimen: Nasopharyngeal Swab   Result Value Ref Range    SARS-CoV-2, NAAT Not Detected Not Detected   CBC auto differential   Result Value Ref Range    WBC 21.2 (H) 4.0 - 11.0 K/uL    RBC 4.02 4.00 - 5.20 M/uL    Hemoglobin 11.9 (L) 12.0 - 16.0 g/dL    Hematocrit 36.9 36.0 - 48.0 %    MCV 91.9 80.0 - 100.0 fL    MCH 29.7 26.0 - 34.0 pg    MCHC 32.3 31.0 - 36.0 g/dL    RDW 13.5 12.4 - 15.4 %    Platelets 664 696 - 831 K/uL    MPV 8.9 5.0 - 10.5 fL   Comprehensive metabolic panel   Result Value Ref Range    Sodium 125 (L) 136 - 145 mmol/L    Potassium 3.7 3.5 - 5.1 mmol/L    Chloride 87 (L) 99 - 110 mmol/L    CO2 15 (L) 21 - 32 mmol/L    Anion Gap 23 (H) 3 - 16    Glucose 167 (H) 70 - 99 mg/dL    BUN 63 (H) 7 - 20 mg/dL    CREATININE 2.0 (H) 0.6 - 1.2 mg/dL    GFR Non-African American 25 (A) >60    GFR  30 (A) >60    Calcium 8.5 8.3 - 10.6 mg/dL    Total Protein 5.5 (L) 6.4 - 8.2 g/dL    Albumin 2.5 (L) 3.4 - 5.0 g/dL    Albumin/Globulin Ratio 0.8 (L) 1.1 - 2.2    Total Bilirubin <0.2 0.0 - 1.0 mg/dL    Alkaline Phosphatase 123 40 - 129 U/L     (H) 10 - 40 U/L     (H) 15 - 37 U/L   Troponin   Result Value Ref Range    Troponin <0.01 <0.01 ng/mL   Lactate, Sepsis   Result Value Ref Range    Lactic Acid, Sepsis 9.9 (HH) 0.4 - 1.9 mmol/L   D-dimer, quantitative   Result Value Ref Range    D-Dimer, Quant >5250 (H) 0 - 229 ng/mL DDU   Blood gas, venous   Result Value Ref Range    pH, Carter 7.130 (LL) 7.350 - 7.450    pCO2, Carter 38.7 (L) 40.0 - 50.0 mmHg    pO2, Carter 62.2 (H) 25.0 - 40.0 mmHg    HCO3, Venous 12.6 (L) 23.0 - 29.0 mmol/L    Base Excess, Carter -15.8 (L) -3.0 - 3.0 mmol/L    O2 Sat, Carter 82 Not Established %    Carboxyhemoglobin 3.5 (H) 0.0 - 1.5 %    MetHgb, Carter 0.0 <1.5 %    TC02 (Calc), Carter 14 Not Established mmol/L    O2 Therapy Unknown    Sample possible blood bank testing   Result Value Ref Range    Specimen Status NEGAR    POCT Glucose   Result Value Ref Range    POC Glucose 87 70 - 99 mg/dl    Performed on ACCU-CHEK    POCT Arterial   Result Value Ref Range    POC Sodium 125 (L) 136 - 145 mmol/L    POC Potassium 3.5 3.5 - 5.1 mmol/L    POC Chloride 91 (L) 99 - 110 mmol/L    POC Glucose 146 (H) 70 - 99 mg/dl    POC Creatinine 2.6 (H) 0.6 - 1.2 mg/dL    GFR Non-African American 18 (A) >60    GFR  22 (A) >60    Calcium, Ion 1.20 1. 12 - 1.32 mmol/L    pH, Arterial 7.257 (L) 7.350 - 7.450    pCO2, Arterial 41.6 35.0 - 45.0 mm Hg    pO2, Arterial 540.1 (HH) 75.0 - 108.0 mm Hg    HCO3, Arterial 18.5 (L) 21.0 - 29.0 mmol/L    Base Excess, Arterial -9 (L) -3 - 3    O2 Sat, Arterial 100 93 - 100 %    TCO2, Arterial 20 Not Established mmol/L    POC Hematocrit 37.0 36.0 - 48.0 %    Hemoglobin 12.7 12.0 - 16.0 gm/dL    Sample Type ART     Performed on SEE BELOW    EKG 12 Lead   Result Value Ref Range    Ventricular Rate 134 BPM    Atrial Rate 134 BPM    P-R Interval 112 ms    QRS Duration 88 ms    Q-T Interval 300 ms    QTc Calculation (Bazett) 448 ms    P Axis 116 degrees    R Axis 103 degrees    T Axis 126 degrees    Diagnosis        Suspect arm lead reversal, interpretation assumes no reversalSinus tachycardiaLateral infarct , age undeterminedPossible Inferior infarct , age undeterminedAbnormal ECGWhen compared with ECG of 22-FEB-2012 15:03,Significant changes have occurred         RADIOLOGY:  All x-ray studies are viewed/reviewedby me. Formal interpretations per the radiologist are as follows:      XR CHEST PORTABLE   Final Result   No acute process. Endotracheal tube in satisfactory position above the ashlee         XR CHEST PORTABLE   Final Result   No evidence of acute cardiopulmonary process. EKG:  See EKG interpretation by an attending phsyician      PROCEDURES:   Procedure Note:  Central Venous Access  Indication: Lack of vascular access. The skin over the right femoral vein was cleansed with ChloraPrep and draped in the usual sterile fashion. After infiltration of 2mL 1% lidocaine without epinephrine for anesthesia, an 18-gauge needle was inserted under ultrasound guidance while aspirating with a 10mL syringe. After a flash of dark venous blood returned, the right femoral vein was accessed with a sterile guidewire through the needle. The needle was then removed over the guidwire. A small skin incision was made with a #11 scalpel over the guidewire. An 8.5 Gibraltarian subcutaneous dilator was then inserted and withdrawn. Using Seldinger technique, the normal saline pre-flushed 7-Gibraltarian triple lumen catheter was inserted over the guidewire and each port accessed for free aspiration and flushed with saline to confirm placement within the venous lumen. The catheter was then secured to the skin using sutures to prevent dislodgement. The area was then recleansed with ChloraPrep and dressed with an antibiotic-infused disc and a sterile occlusive dressing. Patient tolerated procedure well without immediate complications.      Procedure Note:  Orotracheal Intubation  Indication: cardiac arrest    Procedure: The patient was preoxygenated with 100% oxygen and had O2 saturations what were in the % range. A  glidescope blade was inserted into the patients mouth after fasciculations were noted and a good view of the glottis was obtained. An 8 ET tube was then visualized passing between the cords and was secured at 23cm at the lip. The patient had condensation in the ET tube, CO2 capnogroaphic color change and had bilateral breath sounds appreciated. Respiratory therapy was at the bedside and is assisting with ventilatory management. Attending physician was at the bedside during procedure. No immediate complications    CRITICAL CARE TIME:   Total critical care time today provided was at least 43 minutes. This excludes seperately billable procedure. Critical care time provided for cardiac arrest that required close evaluation and/or intervention with concern for patient decompensation. CONSULTS:  IP CONSULT TO CRITICAL CARE  IP CONSULT TO CARDIAC REHAB      EMERGENCYDEPARTMENT COURSE and DIFFERENTIAL DIAGNOSIS/MDM:   Vitals:    Vitals:    12/07/21 1032 12/07/21 1115 12/07/21 1525 12/07/21 1528   BP: 109/88      Pulse: 130  115 115   Resp:  20 29 30   Temp: 97.8 °F (36.6 °C)      TempSrc: Oral      SpO2:  95% (!) 88%    Weight: 145 lb (65.8 kg)      Height: 5' 6\" (1.676 m)              Patient was evaluated by both myself and Dr. Alphonso Dominguez. Patient presented to the emergency room today with general myalgias and hypoxia.   When I initially met with the patient she did not appear to be acutely distressed and again denied any chest pain specifically she just complained of general myalgias she was tachycardic to the 130s so initially I was concerned about possible pulmonary embolus I did order a CT of her chest.  We did have difficulty obtaining IV access and patient due to poor peripheral vasculature, the nursing staff did alert myself as well as the attending that they had difficulty obtaining

## 2021-12-07 NOTE — CONSULTS
Consult Note            Date:2021        Patient Alejandra Arriaza     YOB: 1955     Age:66 y.o. Inpatient consult to Critical Care  Consult performed by: Isabell Vela MD  Consult ordered by: Elian Corcoran MD  Reason for consult: Acute respiratory failure status post cardiac arrest          Chief Complaint     Chief Complaint   Patient presents with    Shortness of Breath     EMS states patient room air O2 70-80s, improved with nasal cannula. not felt well since yesterday. Acute respiratory failure status post cardiac arrest    History Obtained From   electronic medical record    History of Present Illness   Is a 70-year-old female who came to the hospital because of shortness of breath. Patient was taken to Cath Lab. And then returned to the ICU on a ventilator. Patient had left heart cath done showing a lesion in 1 mid LAD and a stent placement. Patient had no active culprit lesion but does have three-vessel critical disease which is probably old as per cardiology. Cardiologist feels that three-vessel disease with severe supply/demand event that outpatient cardiac ability and likely became ischemic going into cardiac arrest.  Patient will have Integrilin for 6 hours, will have NG tube placed with aspirin and ticagrelor. Patient is unable to give any history and is intubated    Past Medical History     Past Medical History:   Diagnosis Date    Asthma     Hypertension     past hx        Past Surgical History     Past Surgical History:   Procedure Laterality Date     SECTION      X 2    FRACTURE SURGERY Left 2017    ORIF wrist fx/ CTR    WRIST FRACTURE SURGERY Right 3/15/2021    OPEN REDUCTION INTERNAL FIXATION RIGHT DISTAL RADIUS -BLOCK- performed by Ja Yu MD at [facility] Left         Medications     Prior to Admission medications    Medication Sig Start Date End Date Taking?  Authorizing Provider   diphenhydrAMINE (BENADRYL) 25 MG capsule Take 50 mg by mouth every 6 hours as needed for Itching    Historical Provider, MD   Diphenhydramine-APAP, sleep, (EXCEDRIN PM PO) Take by mouth nightly as needed    Historical Provider, MD   ibuprofen (ADVIL;MOTRIN) 800 MG tablet Take 1 tablet by mouth every 8 hours as needed for Pain 4/24/17   Muriel Suresh MD   albuterol sulfate  (90 BASE) MCG/ACT inhaler Inhale 2 puffs into the lungs every 6 hours as needed for Wheezing    Historical Provider, MD        methylPREDNISolone sodium (SOLU-MEDROL) injection 40 mg, Once  0.9 % sodium chloride bolus, Once  phenylephrine (SHAE-SYNEPHRINE) 50 mg in dextrose 5 % 250 mL infusion, Continuous  fentaNYL (SUBLIMAZE) 100 MCG/2ML injection,   sodium chloride flush 0.9 % injection 5-40 mL, 2 times per day  sodium chloride flush 0.9 % injection 5-40 mL, PRN  0.9 % sodium chloride infusion, PRN  [START ON 12/8/2021] enoxaparin (LOVENOX) injection 30 mg, Daily  ondansetron (ZOFRAN-ODT) disintegrating tablet 4 mg, Q8H PRN   Or  ondansetron (ZOFRAN) injection 4 mg, Q6H PRN  polyethylene glycol (GLYCOLAX) packet 17 g, Daily PRN  acetaminophen (TYLENOL) tablet 650 mg, Q6H PRN   Or  acetaminophen (TYLENOL) suppository 650 mg, Q6H PRN  pantoprazole (PROTONIX) injection 40 mg, Daily  fentaNYL (SUBLIMAZE) 1,000 mcg in sodium chloride 0.9 % 100 mL infusion, Continuous  fentaNYL (SUBLIMAZE) injection 25 mcg, Q1H PRN  carboxymethylcellulose PF (THERATEARS) 1 % ophthalmic gel 1 drop, 6 times per day  chlorhexidine (PERIDEX) 0.12 % solution 15 mL, BID  midazolam (VERSED) injection 2 mg, Once  midazolam (VERSED) 2 MG/2ML injection,   propofol injection, Titrated    eptifibatide (INTEGRILIN) 0.75 mg/mL infusion, Continuous  propofol injection, Titrated        Allergies   Bactrim, Demeclocycline, Tetracyclines & related, Penicillins, and Sulfa antibiotics    Social History     Social History     Tobacco History     Smoking Status  Former Smoker Quit date  3/12/2014    Smokeless Tobacco Use  Never Used          Alcohol History     Alcohol Use Status  Yes Comment  1-2 drinks per year          Drug Use     Drug Use Status  Yes Types  Marijuana (Weed) Frequency   7 times/week Comment  last time yesterday          Sexual Activity     Sexually Active  Not Asked                Family History     Family History   Problem Relation Age of Onset    Diabetes Mother     Heart Disease Father        Review of Systems   Review of Systems   Unable to perform ROS: Intubated        Physical Exam   /88   Pulse 115   Temp 97.8 °F (36.6 °C) (Oral)   Resp 30   Ht 5' 6\" (1.676 m)   Wt 145 lb (65.8 kg)   SpO2 (!) 88%   BMI 23.40 kg/m²      Physical Exam  Vitals and nursing note reviewed. Constitutional:       General: She is not in acute distress. Appearance: She is not ill-appearing. Comments: Sedated   HENT:      Head: Normocephalic and atraumatic. Right Ear: External ear normal.      Left Ear: External ear normal.      Nose: Nose normal.      Mouth/Throat:      Comments: Intubated  Eyes:      General: No scleral icterus. Extraocular Movements: Extraocular movements intact. Conjunctiva/sclera: Conjunctivae normal.      Pupils: Pupils are equal, round, and reactive to light. Cardiovascular:      Rate and Rhythm: Normal rate and regular rhythm. Pulses: Normal pulses. Heart sounds: Normal heart sounds. No murmur heard. No friction rub. Pulmonary:      Effort: No respiratory distress. Breath sounds: No stridor. No wheezing. Chest:      Chest wall: No tenderness. Abdominal:      General: Abdomen is flat. Bowel sounds are normal. There is no distension. Tenderness: There is no abdominal tenderness. There is no guarding. Musculoskeletal:         General: No swelling or tenderness. Normal range of motion. Cervical back: Normal range of motion and neck supple. No rigidity. Skin:     General: Skin is warm and dry. Coloration: Skin is not jaundiced. Neurological:      Comments: Sedated         Labs    CBC:  Recent Labs     12/07/21  1309 12/07/21  1330   WBC 21.2*  --    RBC 4.02  --    HGB 11.9* 12.7   HCT 36.9  --    MCV 91.9  --    RDW 13.5  --      --      CHEMISTRIES:  Recent Labs     12/07/21  1309 12/07/21  1330   *  --    K 3.7  --    CL 87*  --    CO2 15*  --    BUN 63*  --    CREATININE 2.0* 2.6*   GLUCOSE 167*  --      PT/INR:No results for input(s): PROTIME, INR in the last 72 hours. APTT:No results for input(s): APTT in the last 72 hours. LIVER PROFILE:  Recent Labs     12/07/21  1309   *   *   BILITOT <0.2   ALKPHOS 123       Imaging/Diagnostics   XR CHEST PORTABLE    Result Date: 12/7/2021  No acute process. Endotracheal tube in satisfactory position above the ashlee     XR CHEST PORTABLE    Result Date: 12/7/2021  No evidence of acute cardiopulmonary process. Assessment      Hospital Problems           Last Modified POA    Cardiac arrest [facility]) 12/7/2021 Yes    Coronary artery disease involving native coronary artery of native heart with angina pectoris ([address] 12/7/2021 Yes    Ischemic cardiomyopathy 12/7/2021 Yes          Plan   Neurological  Sedated on  Fentanyl  Propofol      Cardiovascular  CAD with status post cardiac arrest  Status post LAD stent placement and cardiac cath.   Cardiology has been consulted    Continue with  Lovenox 30 subcu daily  Integrilin  Aspirin  Ticagrelor      Hypertension    Pulmonary  Acute respiratory failure status post cardiac cath  On VC plus, tidal volume 400, rate of 16, FiO2 of 60%, PEEP of 5, TI time of 0.7  We will consider weaning tomorrow      Asthma  We will give Solu-Medrol  Albuterol as needed    On home only albuterol as needed      Gastrointestinal  Protonix 40 mg daily    Liver function slightly elevated we will follow      Electrolytes  Sodium 125, BUN of 63        Renal  Creatinine 2.0  We will follow        Prophylaxis  Protonix 40 mg daily  On multiple anticoagulation      CODE STATUS full        Thank for the consult will follow      Critical Care Services Provided: This patient had a critical illness or injury that acutely impaired one or more vital organ systems such that there was a high probability of imminent or life-threatening deterioration in the patient's condition. This required high complexity decision-making to assess, manipulate, and support vital system function(s) to treat single or multiple vital organ system failure and/or to prevent further life-threatening deterioration of the patient's condition.  Total attending physician time, excluding unbundled procedures, spent at the bedside, and away from the bedside but on the unit or floor, reviewing laboratory test results, discussing care with medical staff, and discussing care with family when the patient was unable or incompetent to participate:   Critical Care Time (Minutes) # 35   (Discontinuous)           Electronically signed by Alicia Agustin MD on 12/7/21 at 3:40 PM EST

## 2021-12-07 NOTE — OP NOTE
Operative Note      Patient: Cristino Torres  YOB: 1955  MRN: [id number]      Cardiac Cath  Postoperative Note      1. Pre-operative Diagnosis: ACS/cardiac arrest        Post-operative Diagnosis: Same  2. Surgeons/Assistants: Pastor Rochelle MD, [facility]  3. Complications: None  4. Estimated Blood Loss: less than 50   5. Specimens: Were Not Obtained  6. Anesthesia: Local, Moderate Sedation and Deep Sedation  For sedation: Moderated sedation was achieved with Versed (6mg) and Fentanyl (50mcg). Propofol ggt. Monitoring of the patient's vital signs and respiratory status was provided by a trained independent observer nurse during the entire course of the procedure(s) and under my direct supervision and recorded in patient's medical record. The duration of sedation was 1318 to 1437. 7. Procedure(s) performed: Please see Xims chart for more detailed information on any catheter or equipment use. Further details on the procedure, sedation and proceedings of case  Mod, deep sedation  C  LVG  Cors  PCI to mid LAD  OCT to mid LAD        Procedure Details:  Consent Access  site Bleed Risk Sedat US   Used*? Contrast Flouro TIme Fluoro  mGy   Yes Lt femora A/v low MCSFC yes 235 9.7 664   *CPT 87502: If stated \"yes\", Ultrasound guidance was used to access left femoral using Seldinger technique after local infiltration of 1% lidocaine. Ultrasound(US) documented/visualized size, patency, pulsatility and exact location for puncture and determined ok to proceed. Real-time imaging used for needle entry into the vessel(s). Image was printed off [employer] and sent to medical records on a progress note. *Sedation Note: MCSFC = minimal conscious sedation for comfort      Left Heart Cath:   Findings   LVEDP  5. Following PCI and over 1 L of volume load EDP remained low at 5-6   LVEF  45 to 50%.      LV wall motion Hyperdynamic due to tachycardia but there is severe hypokinesis of the basal and apical inferior and apex   Gradient across AV none   Mitral regurg No significant     Coronary Angiogram:  Artery Findings/Result   LM  luminal regularities   LAD  large vessel. There are very large septal branches that are present giving left to right collaterals. Just after a trifurcation with a large diagonal and septal branch there is a short focal napkin ring lesion of about 70 to 80%. On OCT this area was 1.75 mm and had heavy fibroatheroma and good pools. There are areas of thin Fibrothrombus. The rest of the vessel was tortuous but no significant focal disease. LCx  irregularities. OM1 is a large PL OM branch. There appears to be sequential 80% lesions in the mid section   RI    RCA Dominant, 100% proximal occlusion. There are bridging collaterals after a 10 mm segment of occlusion. The rest of the RCA fills by bridging collaterals and significant competitive flow is seen distally         Results of intervention     Lesion 1: mid LAD  Stent: Xience Diane 2.25 x 38 mm stent. Abbott stent chosen for size and long-term outcomes. Lesion was postdilated to 2.5 mm distally and 3.5 mm proximally  Pre:   70 to 80% stenosis, LYDIA 3 flow  Post: 0% stenosis, LYDIA 3 flow      Assessment/Plan  1. Patient with no active culprit lesion but does have three-vessel critical disease. Patient's initial EKG was suggestive of Wellens T waves/LAD ischemia. Following her arrest echo did show severe anterior wall motion on sono site. I theorized that patient had critical underlying three-vessel disease and had a severe supply/demand event that outpaced her cardiac ability and likely became ischemic going into cardiac arrest.  LAD was therefore intervened upon today to improve flow due to critical stenosis  2. Integrilin x6 hours  3. We will place NG and give aspirin 81 mg daily, ticagrelor 880 mg load followed by 90 mg twice daily  4.   We will hold off on ACE inhibitor given low blood pressures and dye load until creatinine is stable  5. Hold off on beta-blockers now due to relative hypotension pressors are weaning off  6. High-dose statin, echocardiogram  7. Of note patient is actively waking up trying to grab for the tube does appear to be neurologically intact    8. Also she was severely dehydrated on admission and had over 1-1/2 L with little change her LVEDP. I would suspect that her hemoglobin will significantly changed due to hemoconcentration and fluid. Following the case there is no signs of active bleeding in her groin was soft. 9. In addition at the end of the case patient was in sinus tachycardia. We will hold off on any amiodarone drip at this time        Med Rec:   Recommendation Indicated? Not Given Due To: Note   DAPT  y     STATIN - HIGH DOSE y     BETA BLOCKER y  low blood pressure    ACE/ARB/ARNI y  low BP, dye load.     ALDACTONE          Electronically signed by Toshia Thompson MD on 12/7/2021 at 2:42 PM

## 2021-12-07 NOTE — PROGRESS NOTES
1231 - Staff noticed rhythm change on monitor, 's. Patient unresponsive, no pulses detected. CPR initiated. 1234 - IO established in right tibia by Riaz Saxena RN  1235 - 1mg Epi given by Salome Anglin  1236 - Pulse check showing Vfib on monitor, defibrillated at 200J.  1239 - Pulse check showing torsades, .   1239 - Successful intubation by Jhony Salazar NP,   1240 - 1gram Magnesium given via IO.

## 2021-12-08 NOTE — PROGRESS NOTES
ABG completed per order. Ph 7.010  PCO2 61  PO2 79  HCO3 15  BE -15    EPOC Glucose 61, K 5.3, lactic 4.5    Results reviewed with Jeff Bellamy NP. Orders received for 1amp bicarb and 12.5g 50% dextrose. Ventilator RR increased to 23. Plan for CT.

## 2021-12-08 NOTE — PROGRESS NOTES
OGT reattached to low continuous suction, approx 300ml immediate OP of dark red/brown OP noted. Wali Llanos NP notified.    Orders received to repeat CBC

## 2021-12-08 NOTE — PROGRESS NOTES
Hospitalist Progress Note      PCP: No primary care provider on file. Date of Admission: 12/7/2021    Chief Complaint: malaise    Hospital Course:   73 y.o. female with hx of htn, asthma who presented to [facility] with ongoing malaise and p/w SOB to [facility]. During workup, pt had cardiac arrest and cards consulted([facility] done). Pt placed in ICU.     Subjective: remains intubated/sedated , dark NG outpt noted today    Medications:  Reviewed    Infusion Medications    dexmedetomidine 0.6 mcg/kg/hr (12/08/21 1316)    sodium chloride      vasopressin (Septic Shock) infusion      phenylephrine (SHAE-SYNEPHRINE) 50mg/250mL infusion 175 mcg/min (12/08/21 1305)    sodium chloride      fentaNYL (SUBLIMAZE) infusion 75 mcg/hr (12/08/21 1338)    propofol Stopped (12/08/21 1143)    sodium chloride       Scheduled Medications    mupirocin   Nasal BID    ipratropium-albuterol  1 ampule Inhalation Q4H WA    cefTRIAXone (ROCEPHIN) IV  1,000 mg IntraVENous Q24H    metroNIDAZOLE  500 mg IntraVENous Q8H    sodium chloride  1,000 mL IntraVENous Once    sodium chloride flush  5-40 mL IntraVENous 2 times per day    pantoprazole  40 mg IntraVENous Daily    carboxymethylcellulose PF  1 drop Both Eyes 6 times per day    chlorhexidine  15 mL Mouth/Throat BID    sodium chloride flush  5-40 mL IntraVENous 2 times per day    rosuvastatin  40 mg Oral Nightly    aspirin  81 mg Oral Daily    ticagrelor  90 mg Oral BID     PRN Meds: magnesium sulfate, potassium chloride, perflutren lipid microspheres, dextrose, sodium chloride flush, sodium chloride, ondansetron **OR** ondansetron, polyethylene glycol, acetaminophen **OR** acetaminophen, fentanNYL, sodium chloride flush, sodium chloride      Intake/Output Summary (Last 24 hours) at 12/8/2021 1450  Last data filed at 12/8/2021 0800  Gross per 24 hour   Intake 639 ml   Output 2115 ml   Net -1476 ml       Physical Exam Performed:    BP 92/68   Pulse 121   Temp 100.6 °F (38.1 °C) (Bladder)   Resp 23   Ht 5' 6\" (1.676 m)   Wt 151 lb 10.8 oz (68.8 kg)   SpO2 94%   BMI 24.48 kg/m²       General appearance:  No apparent distress, appears stated age and cooperative. Intubated/sedated  HEENT:  Normal cephalic, atraumatic without obvious deformity. Pupils equal, round, and reactive to light. Extra ocular muscles intact. Conjunctivae/corneas clear. Neck: Supple, with full range of motion. No jugular venous distention. Trachea midline. Respiratory:  Normal respiratory effort. Clear to auscultation, bilaterally without Rales/Wheezes/Rhonchi. Cardiovascular:  Regular rate and rhythm with normal S1/S2 without murmurs, rubs or gallops. Abdomen: Soft, non-tender, non-distended with normal bowel sounds. Musculoskeletal:  No clubbing, cyanosis or edema bilaterally. Full range of motion without deformity. Skin: Skin color, texture, turgor normal.  No rashes or lesions. Neurologic:  Neurovascularly intact without any focal sensory/motor deficits. Cranial nerves: II-XII intact, grossly non-focal.  Psychiatric:  Alert and oriented c0, sedated/intubated  Capillary Refill: Brisk,3 seconds, normal  Peripheral Pulses: +2 palpable, equal bilaterally       Labs:   Recent Labs     12/07/21  1309 12/07/21  1330 12/08/21  0540 12/08/21  0540 12/08/21  1357 12/08/21  1410 12/08/21  1417   WBC 21.2*  --  16.3*  --   --   --   --    HGB 11.9*   < > 10.8*   < > 9.3* 9.4* 9.1*   HCT 36.9  --  32.2*  --   --  29.0*  --      --  98*  --   --   --   --     < > = values in this interval not displayed. Recent Labs     12/08/21  0000 12/08/21  0000 12/08/21  0540 12/08/21  1137   *  --  128* 131*   K 3.3*   < > 4.4  4.4 4.7   CL 98*  --  96* 97*   CO2 20*  --  17* 17*   BUN 60*  --  62* 69*   CREATININE 1.6*  --  1.9* 2.6*   CALCIUM 7.9*  --  8.0* 8.1*    < > = values in this interval not displayed.      Recent Labs     12/07/21  1309   *   *   BILITOT <0.2   ALKPHOS 123 No results for input(s): INR in the last 72 hours. Recent Labs     12/07/21  1309 12/08/21  1137   TROPONINI <0.01 0.13*       Urinalysis:      Lab Results   Component Value Date    BLOODU NEG 02/22/2012    SPECGRAV 1.030 02/22/2012    GLUCOSEU NEG 02/22/2012       Radiology:  XR CHEST PORTABLE   Final Result   No acute process. Endotracheal tube in satisfactory position above the ashlee         XR CHEST PORTABLE   Final Result   No evidence of acute cardiopulmonary process.          CT HEAD WO CONTRAST    (Results Pending)   CT CHEST ABDOMEN PELVIS WO CONTRAST    (Results Pending)           Assessment/Plan:    Active Hospital Problems    Diagnosis     Cardiac arrest [facility]) [I46.9]     Coronary artery disease involving native coronary artery of native heart with angina pectoris ([facility] [address] [I25.119]     Ischemic cardiomyopathy [I25.5]        Cardiac arrest- witnessed in ER, with ROSC, concern for stemi upon recovery(pt apparently had intact neuro fcn upon recovery)  -cards on board, taken to [facility](ROBBIN placed to LAD, noted chronic 3v disease)  -ICU care  -femoral CVC placed 12/7 in ER  -neurochecks ordered   -echo ordered(ef 45%, hk noted, g1 dd  -on brilinta/asa/statin    Shock- likely from cardiac and possibly septic  -on iv abx  -pressors as needed    Acute resp failure- due to above, intubated 12/7  -pulm/cc consulted, apprec mgmt of vent  -on sedation   -started on rocephin/flagyl 12/8 given fevers/elevated procal of >100    Acute encephalopathy- unclear if signif anoxic injury  -will need to reeval when more stable    Cardiomyopathy- likely ischemic  -mgmt per cards  -start bb/acei when able    Hyponatremia/AG Metabolic acidosis/HARLEEN- developed after arrest  -nephro consulted, apprec mgmt    HTN- held all home meds     CAD- per emr     Asthma--on prn albuterol at home  -duonebs prn ordered here     GI ppx: iv protonix  DVT Prophylaxis: renal dose lovenox  Diet: Diet NPO  Code Status: Full Code    PT/OT Eval Status: not possible    Dispo - icu care    Toya Trujillo MD

## 2021-12-08 NOTE — PROGRESS NOTES
[facility] [address]   Daily Cardiovascular Progress Note    Admit Date: 12/7/2021    Chief complaint: Shortness of breath  HPI:     Patient presented to emergency room yesterday with shortness breath, was found to have VT arrest, underwent CPR and defibrillation. Patient was in shock, placed on vasopressors and underwent emergency heart catheterization with Dr. Lucio Schwartz, she was found to have multivessel CAD/ASHD and was treated with LAD stenting. Since then, attempts are being made to wean patient from vasopressors as well as sedation and from ventilation and this appears to be progressing slowly. Additionally, after reviewing the chart as well as speaking to the bedside nurse, patient has been noted to have mottling in the extremities and has developed HARLEEN.       Medications/Labs all Reviewed:  Patient Active Problem List   Diagnosis    Left carpal tunnel syndrome    Fracture, Colles, right, closed    Anxiety    Asthma    Chronic tension-type headache, not intractable    Essential hypertension    High cholesterol    Other and unspecified hyperlipidemia    Other type of migraine    Sleep disorder    [facility]    Coronary artery disease involving native coronary artery of native heart with angina pectoris ([facility] [address]    Ischemic cardiomyopathy       Medications:  magnesium sulfate 1000 mg in dextrose 5% 100 mL IVPB, PRN  potassium chloride 20 mEq/50 mL IVPB (Central Line), PRN  mupirocin (BACTROBAN) 2 % ointment, BID  ipratropium-albuterol (DUONEB) nebulizer solution 1 ampule, Q4H WA  cefTRIAXone (ROCEPHIN) 1000 mg IVPB in 50 mL D5W minibag, Q24H  metronidazole (FLAGYL) 500 mg in NaCl 100 mL IVPB premix, Q8H  0.9 % sodium chloride bolus, Once  perflutren lipid microspheres (DEFINITY) injection 1.65 mg, ONCE PRN  phenylephrine (SHAE-SYNEPHRINE) 50 mg in dextrose 5 % 250 mL infusion, Continuous  sodium chloride flush 0.9 % injection 5-40 mL, 2 times per day  sodium chloride flush 0.9 % injection 5-40 mL, PRN  0.9 % sodium chloride infusion, PRN  ondansetron (ZOFRAN-ODT) disintegrating tablet 4 mg, Q8H PRN   Or  ondansetron (ZOFRAN) injection 4 mg, Q6H PRN  polyethylene glycol (GLYCOLAX) packet 17 g, Daily PRN  acetaminophen (TYLENOL) tablet 650 mg, Q6H PRN   Or  acetaminophen (TYLENOL) suppository 650 mg, Q6H PRN  pantoprazole (PROTONIX) injection 40 mg, Daily  fentaNYL (SUBLIMAZE) 1,000 mcg in sodium chloride 0.9 % 100 mL infusion, Continuous  fentaNYL (SUBLIMAZE) injection 25 mcg, Q1H PRN  carboxymethylcellulose PF (THERATEARS) 1 % ophthalmic gel 1 drop, 6 times per day  chlorhexidine (PERIDEX) 0.12 % solution 15 mL, BID  propofol injection, Titrated  sodium chloride flush 0.9 % injection 5-40 mL, 2 times per day  sodium chloride flush 0.9 % injection 5-40 mL, PRN  0.9 % sodium chloride infusion, PRN  rosuvastatin (CRESTOR) tablet 40 mg, Nightly  aspirin chewable tablet 81 mg, Daily  ticagrelor (BRILINTA) tablet 90 mg, BID           PHYSICAL EXAM   BP (!) 93/48   Pulse 131   Temp 100.5 °F (38.1 °C) (Bladder)   Resp 19   Ht 5' 6\" (1.676 m)   Wt 151 lb 10.8 oz (68.8 kg)   SpO2 94%   BMI 24.48 kg/m²    Vitals:    12/08/21 0730 12/08/21 0815 12/08/21 0834 12/08/21 0936   BP: (!) 93/48      Pulse: 125 124  131   Resp: 13 16 16 19   Temp:       TempSrc:       SpO2: 96% 95% 96% 94%   Weight:       Height:             Intake/Output Summary (Last 24 hours) at 12/8/2021 1000  Last data filed at 12/8/2021 0545  Gross per 24 hour   Intake 639 ml   Output 2050 ml   Net -1411 ml     Wt Readings from Last 3 Encounters:   12/08/21 151 lb 10.8 oz (68.8 kg)   04/07/21 145 lb (65.8 kg)   03/15/21 145 lb (65.8 kg)         Gen: Patient intubated, sedated, nonresponsive  Neck: No JVD   Respiratory: Bilateral rhonchi  Chest: normal without deformity  Cardiovascular:RRR, S1S2, tachycardic, distant heart sounds  Abdomen: Soft, groin assessment shows slight oozing at left groin site, otherwise no hematoma or bruit or thrill noted  Extremities: Mottling particular in the lower extremities, dopplerable pulses  Neurological/Psychiatric: Unable to obtain as patient is intubated/sedated  Skin: Cool      Labs:  CBC:   Recent Labs     12/07/21  1309 12/07/21  1330 12/08/21  0540   WBC 21.2*  --  16.3*   HGB 11.9* 12.7 10.8*   HCT 36.9  --  32.2*   MCV 91.9  --  90.2     --  98*     BMP:   Recent Labs     12/07/21  1309 12/07/21  1330 12/07/21  1800 12/08/21  0000 12/08/21  0540   *  --  126* 132* 128*   K 3.7  --  3.4* 3.3* 4.4  4.4   CL 87*  --  91* 98* 96*   CO2 15*  --  21 20* 17*   BUN 63*  --  61* 60* 62*   CREATININE 2.0* 2.6* 1.6* 1.6* 1.9*     MG:    Recent Labs     12/07/21  1800 12/08/21  0000   MG 2.60* 2.60*      PT/INR: No results for input(s): PROTIME, INR in the last 72 hours. APTT: No results for input(s): APTT in the last 72 hours. Cardiac Enzymes:   Recent Labs     12/07/21  1309   TROPONINI <0.01       Cardiac Studies:    cath      Cardiac Cath  Postoperative Note        1. Pre-operative Diagnosis: ACS/cardiac arrest        Post-operative Diagnosis: Same  2. Surgeons/Assistants: Lucia Duncan MD, [facility]  3. Complications: None  4. Estimated Blood Loss: less than 50   5. Specimens: Were Not Obtained  6. Anesthesia: Local, Moderate Sedation and Deep Sedation  For sedation: Moderated sedation was achieved with Versed (6mg) and Fentanyl (50mcg). Propofol ggt. Monitoring of the patient's vital signs and respiratory status was provided by a trained independent observer nurse during the entire course of the procedure(s) and under my direct supervision and recorded in patient's medical record. The duration of sedation was 1318 to 1437.     7. Procedure(s) performed: Please see Xims chart for more detailed information on any catheter or equipment use.   Further details on the procedure, sedation and proceedings of case  Mod, deep sedation  [facility]  LVG  Cors  PCI to mid LAD  OCT to mid LAD           Procedure Details:  Consent Access  site Bleed Risk Sedat US   Used*? Contrast Flouro TIme Fluoro  mGy   Yes Lt femora A/v low MCSFC yes 235 9.7 664   *CPT 89085: If stated \"yes\", Ultrasound guidance was used to access left femoral using Seldinger technique after local infiltration of 1% lidocaine. Ultrasound(US) documented/visualized size, patency, pulsatility and exact location for puncture and determined ok to proceed. Real-time imaging used for needle entry into the vessel(s). Image was printed off [facility] and sent to medical records on a progress note. *Sedation Note: MCSFC = minimal conscious sedation for comfort        Left Heart Cath:    Findings   LVEDP  5. Following PCI and over 1 L of volume load EDP remained low at 5-6   LVEF  45 to 50%. LV wall motion Hyperdynamic due to tachycardia but there is severe hypokinesis of the basal and apical inferior and apex   Gradient across AV none   Mitral regurg No significant      Coronary Angiogram:  Artery Findings/Result   LM  luminal regularities   LAD  large vessel. There are very large septal branches that are present giving left to right collaterals. Just after a trifurcation with a large diagonal and septal branch there is a short focal napkin ring lesion of about 70 to 80%. On OCT this area was 1.75 mm and had heavy fibroatheroma and good pools. There are areas of thin Fibrothrombus. The rest of the vessel was tortuous but no significant focal disease. LCx  irregularities. OM1 is a large PL OM branch. There appears to be sequential 80% lesions in the mid section   RI     RCA Dominant, 100% proximal occlusion. There are bridging collaterals after a 10 mm segment of occlusion. The rest of the RCA fills by bridging collaterals and significant competitive flow is seen distally            Results of intervention      Lesion 1: mid LAD  Stent: Xience Diane 2.25 x 38 mm stent.   Abbott stent chosen for size and long-term outcomes. Lesion was postdilated to 2.5 mm distally and 3.5 mm proximally  Pre:   70 to 80% stenosis, LYDIA 3 flow  Post: 0% stenosis, LYDIA 3 flow        Assessment/Plan  1. Patient with no active culprit lesion but does have three-vessel critical disease. Patient's initial EKG was suggestive of Wellens T waves/LAD ischemia. Following her arrest echo did show severe anterior wall motion on sono site. I theorized that patient had critical underlying three-vessel disease and had a severe supply/demand event that outpaced her cardiac ability and likely became ischemic going into cardiac arrest.  LAD was therefore intervened upon today to improve flow due to critical stenosis  2. Integrilin x6 hours  3. We will place NG and give aspirin 81 mg daily, ticagrelor 880 mg load followed by 90 mg twice daily  4. We will hold off on ACE inhibitor given low blood pressures and dye load until creatinine is stable  5. Hold off on beta-blockers now due to relative hypotension pressors are weaning off  6. High-dose statin, echocardiogram  7. Of note patient is actively waking up trying to grab for the tube does appear to be neurologically intact     8. Also she was severely dehydrated on admission and had over 1-1/2 L with little change her LVEDP. I would suspect that her hemoglobin will significantly changed due to hemoconcentration and fluid. Following the case there is no signs of active bleeding in her groin was soft. 9. In addition at the end of the case patient was in sinus tachycardia. We will hold off on any amiodarone drip at this time           Med Rec:            Recommendation Indicated?  Not Given Due To: Note   DAPT  y       STATIN - HIGH DOSE y       BETA BLOCKER y  low blood pressure     ACE/ARB/ARNI y  low BP, dye load.     ALDACTONE               Electronically signed by Vannesa Baxter MD on 12/7/2021 at 2:42 PM               I have reviewed labs and imaging/xray/diagnostic testing in this note.    Assessment and Plan         Patient Active Problem List   Diagnosis    Left carpal tunnel syndrome    Fracture, Colles, right, closed    Anxiety    Asthma    Chronic tension-type headache, not intractable    Essential hypertension    High cholesterol    Other and unspecified hyperlipidemia    Other type of migraine    Sleep disorder    Card[facility] ([facility] [address]    Coronary artery disease involving native coronary artery of native heart with angina pectoris ([facility] [address]    Ischemic cardiomyopathy     VT cardiac arrest, status post PCI of LAD, prognosis appears to be guarded, will need to determine neurologic recovery, supportive measures are in place. Obtain follow-up EKG, echo and serial troponin levels. CAD/ASHD, multivessel, continue dual antiplatelet therapy, can consider follow-up PCI of circumflex and RCA if patient is able to recover from acute illness. This would likely have to be deferred to the outpatient setting. Shock, continue vasopressors, wean as able, no beta-blockers due to shock    Hypercholesterolemia, continue statin    Acute respiratory failure, as per primary service and ICU team    Critical care time 35 minutes    Thank you for allowing me to participate in the care of your patient. Please call me with any questions [phone].       Hilary Farrell MD, [facility] [place]   Interventional Cardiologist  [facility]  [phone] [facility]  [phone] [address] [place]  12/8/2021 10:00 AM

## 2021-12-08 NOTE — PROGRESS NOTES
CRRT orders placed by Dr. Chandu Crooks. No current CRRT machines present or available in house at this time. Per , a CRRT machine is currently in route, ETA 2hrs (1900), critical care team aware.

## 2021-12-08 NOTE — PROGRESS NOTES
Intensive Care Progress Note    Patient: Prudencio Weston MRN: [id number]  Date of  Admission: 12/7/2021   YOB: 1955  Age: 73 y.o. Sex: female    Unit: [place] [address] CVU  Room/Bed: 0231/0231-01 Attending Physician: Rose Mary Gatica MD   Admitting Physician: Anjali Osullivan        Chief Complaint   Patient presents with    Shortness of Breath       EMS states patient room air O2 70-80s, improved with nasal cannula. not felt well since yesterday. Acute respiratory failure status post cardiac arrest     History Obtained From   electronic medical record     History of Present Illness   Is a 64-year-old female who came to the hospital because of shortness of breath. Patient was taken to Cath Lab. And then returned to the ICU on a ventilator. Patient had left heart cath done showing a lesion in 1 mid LAD and a stent placement. Patient had no active culprit lesion but does have three-vessel critical disease which is probably old as per cardiology. Cardiologist feels that three-vessel disease with severe supply/demand event that outpatient cardiac ability and likely became ischemic going into cardiac arrest.  Patient will have Integrilin for 6 hours, will have NG tube placed with aspirin and ticagrelor. Patient is unable to give any history and is intubated        Subjective:    No issues overnight  No chest pains  Still tolerating sedation and ventilator  Catheter placed in the left groin was taken out this morning, will need to be not bending the site until about noon today  We will try to do SBT      ROS:Review of systems not obtained due to patient factors. Objective: Intake and Output:   Current Shift:   No intake/output data recorded.   Last three shifts:   12/07 0701 - 12/08 0700  In: 573 [I.V.:639]  Out: 2050 [Urine:1550]    Vent settings for last 24 hours:  [unfilled]    Hemodynamic parameters for last 24 hours:  [unfilled]    Physical Exam:   Patient Vitals for the past 24 hrs:   BP Temp Temp src Pulse Resp SpO2 Height Weight   12/08/21 0730 (!) 93/48   125 13 96 %     12/08/21 0715 (!) 93/51   125 16      12/08/21 0700 87/65   125 14 96 %     12/08/21 0655 85/60   124 14 96 %     12/08/21 0647 84/62   125 15 96 %     12/08/21 0642 (!) 92/56   126 15 97 %     12/08/21 0635 (!) 93/42   124 16 96 %     12/08/21 0634 (!) 93/42   125 16 97 %     12/08/21 0630 (!) 90/43   124 16 96 %     12/08/21 0616 (!) 86/49   127 16 96 %     12/08/21 0600 (!) 81/54   125 14 96 %     12/08/21 0550 94/66   126 15 97 %     12/08/21 0545 (!) 79/52   125 16 97 %     12/08/21 0530 (!) 85/48   127 16 97 %     12/08/21 0515 (!) 84/53   128 16 98 %     12/08/21 0500 85/70   127 16 100 %     12/08/21 0430 95/61   125 18 97 %     12/08/21 0428        151 lb 10.8 oz (68.8 kg)   12/08/21 0415 (!) 90/51 100.5 °F (38.1 °C) Bladder 124 18 95 %     12/08/21 0411    123 16 95 %     12/08/21 0400 (!) 89/39   124 16 95 %     12/08/21 0330 (!) 101/55   123 16      12/08/21 0315 102/65   125 17 96 %     12/08/21 0300 85/67   124 17 96 %     12/08/21 0230 102/75   124 17 96 %     12/08/21 0200 (!) 95/50 100.7 °F (38.2 °C) Bladder 124 19 97 %     12/08/21 0130 95/74   122 18 97 %     12/08/21 0100 (!) 98/55   121 18 95 %     12/08/21 0030 (!) 97/49   118 16 95 %     12/08/21 0003    113 17      12/08/21 0000 99/62 101.1 °F (38.4 °C) Bladder 113 17 96 %     12/07/21 2347 (!) 100/56   112 17      12/07/21 2332 (!) 108/56   113 19      12/07/21 2301 (!) 101/54 101.8 °F (38.8 °C) Bladder 116 18 95 %     12/07/21 2209 105/61   117       12/07/21 2202 105/61   117 21      12/07/21 2113    119 23      12/07/21 2109  102.3 °F (39.1 °C) Bladder        12/07/21 2100    120       12/07/21 1842  99.1 °F (37.3 °C) Bladder        12/07/21 1800    101 26    12/07/21 1730    103 29      12/07/21 1716 97/61 98.7 °F (37.1 °C) Bladder 103 29      12/07/21 1600  98.7 °F (37.1 °C)  107       12/07/21 1528    115 30      12/07/21 1525    115 29 (!) 88 %     12/07/21 1115     20 95 %     12/07/21 1032 109/88 97.8 °F (36.6 °C) Oral 130   5' 6\" (1.676 m) 145 lb (65.8 kg)            Physical Exam  Vitals and nursing note reviewed. Constitutional:       General: She is not in acute distress. Appearance: Normal appearance. She is obese. Comments: Sedated   HENT:      Head: Normocephalic and atraumatic. Right Ear: External ear normal.      Left Ear: External ear normal.      Nose: Nose normal.      Mouth/Throat:      Comments: ET tube in place  Eyes:      General:         Right eye: No discharge. Left eye: No discharge. Extraocular Movements: Extraocular movements intact. Conjunctiva/sclera: Conjunctivae normal.      Pupils: Pupils are equal, round, and reactive to light. Cardiovascular:      Rate and Rhythm: Normal rate and regular rhythm. Pulses: Normal pulses. Heart sounds: No murmur heard. Pulmonary:      Effort: No respiratory distress. Breath sounds: No stridor. No wheezing, rhonchi or rales. Chest:      Chest wall: No tenderness. Abdominal:      General: Bowel sounds are normal. There is no distension. Palpations: Abdomen is soft. There is no mass. Tenderness: There is no abdominal tenderness. Hernia: No hernia is present. Musculoskeletal:         General: No swelling. Normal range of motion. Cervical back: Normal range of motion. No rigidity. Skin:     General: Skin is warm and dry. Coloration: Skin is not jaundiced or pale.    Neurological:      Comments: Sedated           Labs:   Recent Labs     12/07/21  1309 12/07/21  1330 12/08/21  0540   WBC 21.2*  --  16.3*   HGB 11.9* 12.7 10.8*   HCT 36.9  --  32.2*     --  98*      Recent Labs 12/07/21  1800 12/07/21  1800 12/08/21  0000 12/08/21  0540   *  --  132* 128*   K 3.4*   < > 3.3* 4.4  4.4   CL 91*  --  98* 96*   CO2 21  --  20* 17*   BUN 61*  --  60* 62*   GLUCOSE 133*  --  90 82    < > = values in this interval not displayed. Additional labs:    Radiology, images personally reviewed. Yes  XR CHEST PORTABLE [[id number]] Collected: 12/07/21 1317      Order Status: Completed Updated: 12/07/21 1320     Narrative:       EXAMINATION:   ONE XRAY VIEW OF THE CHEST     12/7/2021 1:10 pm     COMPARISON:   12/07/2021 at 10:29 a.m. HISTORY:   ORDERING SYSTEM PROVIDED HISTORY: stemi   TECHNOLOGIST PROVIDED HISTORY:   Reason for exam:->stemi   Reason for Exam: stemi   Acuity: Acute   Type of Exam: Initial     FINDINGS:   Endotracheal tube in satisfactory position above the ashlee. The lungs are without acute focal process.  There is no effusion or   pneumothorax. The cardiomediastinal silhouette is stable. The osseous   structures are stable.      Impression:       No acute process. Endotracheal tube in satisfactory position above the ashlee              Other imaging: Not indicated      Micro: Negative growth to date    Assessment:     Active Problems:    Cardiac arrest [facility])    Coronary artery disease involving native coronary artery of native heart with angina pectoris ([facility] [address]    Ischemic cardiomyopathy  Resolved Problems:    * No resolved hospital problems. *      Discussion / Plan:       Neurological  Sedated on  Fentanyl  Propofol     Will wean sedation  And wake up patient    Could use precedex, if needed for sedation and keeping the patient calm during weaning trial     Cardiovascular  CAD with status post cardiac arrest  Status post LAD stent placement and cardiac cath.   Cardiology has been consulted, and following     Continue with  Lovenox 30 subcu daily  Integrilin- stopped  Aspirin  Ticagrelor/Brlinta        Hypotension  On Dileep  Will give fluids  We will maintain a baseline IV fluid resuscitation as the patient probably does need some more fluid despite having undergone cardiac catheterization,    Pulmonary  Acute respiratory failure status post cardiac cath  On VC plus, tidal volume 400, rate of 16, FiO2 of 60%, PEEP of 5, TI time of 0.7  We will consider weaning today  Patient was placed on SBT and has been tolerating it well with good tidal volumes and R SBI number less than 100        Asthma  Solu-Medrol  Albuterol as needed    May consider adding   symbicort        Febrile  Will get procalacitonin  Will start on rocephin and flagyl       On home only albuterol as needed        Gastrointestinal  Protonix 40 mg daily     Liver function slightly elevated we will follow        Electrolytes  Sodium 128, BUN of 63  We will follow electrolytes and replace IV fluids for now        Renal  Creatinine 1.9  We will follow     Will considering giving  IV NS bolus and then drip         Prophylaxis  Protonix 40 mg daily  On multiple anticoagulation        CODE STATUS full           Guru in multidisciplinary rounds, with nursing, dietitian and pharmacy        Critical Care Services Provided:   This patient had a critical illness or injury that acutely impaired one or more vital organ systems such that there was a high probability of imminent or life-threatening deterioration in the patient's condition. This required high complexity decision-making to assess, manipulate, and support vital system function(s) to treat single or multiple vital organ system failure and/or to prevent further life-threatening deterioration of the patient's condition. Total attending physician time, excluding unbundled procedures, spent at the bedside, and away from the bedside but on the unit or floor, reviewing laboratory test results, discussing care with medical staff, and discussing care with family when the patient was unable or incompetent to participate:   Critical Care Time (Minutes) # 35   (Discontinuous)                          MD Erica Mullins  Pulmonary, Critical Care and Sleep Medicine  Office : [phone]    Please note that some or all of this record was generated using voice recognition software. If there are any questions about the content of this document, please contact the author as some errors in transcription may have occurred.

## 2021-12-08 NOTE — PROGRESS NOTES
12/08/21 1512   Vent Information   Vent Type 980   Vent Mode AC/VC+   Vt Ordered 450 mL   Rate Set 25 bmp   FiO2  70 %   SpO2 95 %   SpO2/FiO2 ratio 135.71   Sensitivity 2   PEEP/CPAP 8   I Time/ I Time % 0.8 s   Humidification Source HME   Vent Patient Data   Peak Inspiratory Pressure 38 cmH2O   Mean Airway Pressure 18 cmH20   Rate Measured 27 br/min   Vt Exhaled 480 mL   Minute Volume 11 Liters   I:E Ratio 1:2   Spontaneous Breathing Trial (SBT) RT Doc   Pulse 116   Breath Sounds   Right Upper Lobe Expiratory Wheezes   Right Middle Lobe Expiratory Wheezes   Right Lower Lobe Diminished   Left Upper Lobe Expiratory Wheezes   Left Lower Lobe Diminished   Additional Respiratory  Assessments   Resp 21   Alarm Settings   High Pressure Alarm 45 cmH2O   Low Minute Volume Alarm 2 L/min   High Respiratory Rate 40 br/min   Low Exhaled Vt  200 mL   ETT (adult)   Placement Date/Time: 12/07/21 1529   Tube Size: 7.5 mm  Location: Oral  Secured at: 24 cm  Placed By: In ED  Measured From: Lips   Secured at 24 cm   Measured From [address] By [employer] tube steen   Site Condition Dry   Cuff Pressure 30 cm H2O

## 2021-12-08 NOTE — PROGRESS NOTES
12/08/21 0003   Vent Information   Skin Assessment Clean, dry, & intact   Vent Type 980   Vent Mode AC/VC+   Vt Ordered 400 mL   Rate Set 16 bmp   Pressure Support 0 cmH20   FiO2  60 %   Sensitivity 3   PEEP/CPAP 5   I Time/ I Time % 1 s   Vent Patient Data   Peak Inspiratory Pressure 10 cmH2O   Mean Airway Pressure 6.4 cmH20   Rate Measured 20 br/min   Vt Exhaled 258 mL   Minute Volume 7.37 Liters   I:E Ratio 1:1.60   Cough/Sputum   Sputum How Obtained Endotracheal   Cough Non-productive   Spontaneous Breathing Trial (SBT) RT Doc   Pulse 113   Breath Sounds   Right Upper Lobe Clear   Right Middle Lobe Diminished   Right Lower Lobe Diminished   Left Upper Lobe Clear   Left Lower Lobe Diminished   Additional Respiratory  Assessments   Resp 17   Position Semi-Irizarry's   Cuff Pressure (cm H2O) 30 cm H2O   Alarm Settings   High Pressure Alarm 45 cmH2O   Low Minute Volume Alarm 2 L/min   High Respiratory Rate 40 br/min   Low Exhaled Vt  200 mL   Patient Observation   Observations Ambu @ bedside.  ETT moved to right side   ETT (adult)   Placement Date/Time: 12/07/21 1529   Tube Size: 7.5 mm  Location: Oral  Secured at: 25 cm  Placed By: In ED  Measured From: Lips   Secured at 25 cm   Measured From Lips   ET Placement Right   Secured By [employer] tube steen   Site Condition Dry   Cuff Pressure 30 cm H2O

## 2021-12-08 NOTE — PROCEDURES
Arterial Line Placement Procedure Note                     Indication: metabolic derangement, arterial blood gases and severe hypotension    Consent: Unable to be obtained due to the emergent nature of this procedure. Brandan's Test: Normal    Procedure: The skin over the left brachial artery was prepped with Chloraprep and draped in a sterile fashion. Local anesthesia was not performed due to the emergent nature of this procedure. A 20 gauge angiocath was then inserted, over a needle, into the vessel. The transducer set was then attached and securely fastened to the skin with sutures and with an adhesive dressing. Waveforms on the monitor were observed and found to be adequate. The patient had good distal perfusion after the procedure. The site was then dressed in a sterile fashion. The patient tolerated the procedure well. Complications: None. EBL less than 3cc. KASHMIR Perez-CNP

## 2021-12-08 NOTE — PROGRESS NOTES
Repeat ABG collected. Ph 6.970  PCO2 60.9  PO2 156  HCO3 14  BE -17.8    Ventilator RR remains at RR 28, , Peep 5, Fio2 70%     Results reviewed with Mirna Young, orders received to admin 2amps sodium bicarbonate and start bicarb gtt as previously ordered by nephrology.

## 2021-12-08 NOTE — PROGRESS NOTES
12/08/21 0411   Vent Information   $Ventilation $Subsequent Day   Skin Assessment Clean, dry, & intact   Equipment Changed HME   Vent Type 980   Vent Mode AC/VC+   Vt Ordered 400 mL   Rate Set 16 bmp   Pressure Support 0 cmH20   FiO2  60 %   SpO2 95 %   SpO2/FiO2 ratio 158.33   Sensitivity 3   PEEP/CPAP 5   I Time/ I Time % 1 s   Humidification Source HME   Vent Patient Data   Peak Inspiratory Pressure 11 cmH2O   Mean Airway Pressure 6.4 cmH20   Rate Measured 17 br/min   Vt Exhaled 570 mL   Minute Volume 9.67 Liters   I:E Ratio 1:2.40   Spontaneous Breathing Trial (SBT) RT Doc   Pulse 123   Breath Sounds   Right Upper Lobe Clear   Right Middle Lobe Diminished   Right Lower Lobe Diminished   Left Upper Lobe Clear   Left Lower Lobe Diminished   Additional Respiratory  Assessments   Resp 16   Position Semi-Irizarry's   Cuff Pressure (cm H2O) 30 cm H2O   Alarm Settings   High Pressure Alarm 45 cmH2O   Low Minute Volume Alarm 2 L/min   High Respiratory Rate 40 br/min   Low Exhaled Vt  200 mL   Patient Observation   Observations AMbu @ bedside.  ETT moved to right side   ETT (adult)   Placement Date/Time: 12/07/21 1529   Tube Size: 7.5 mm  Location: Oral  Secured at: 25 cm  Placed By: In ED  Measured From: Lips   Secured at 25 cm   Measured From Lips   ET Placement Right   Secured By [employer] tube steen   Site Condition Dry   Cuff Pressure 30 cm H2O

## 2021-12-08 NOTE — PROGRESS NOTES
12/8/21 0038  Perfect serve to Dr. Manuel [address] or Facility: [facility] From: Marianne Conroy RE: Laxmi Arab 1955 RM: 66 Pt s/p cardiac arrest, s/p heart cath with multivessel disease, s/p stent placement. Pt has bmp order q6 hrs. Potassium 3.3, Sodium 132, BUN 60, Cr 1.6. Pt does not have potassium replacement protocol order. Pt currently on elijah gtt to keep MAP >65. Pt has temp 101. Pt does not have MIVF order. Please advise. 12/8/21 0100  Dr. Manuel Carrillo aware of recent bmp result and above perfect serve message. Order received for Potassium replacement protocol prn. Will replace Potassium per protocol.

## 2021-12-08 NOTE — PROGRESS NOTES
This note also relates to the following rows which could not be included:  Pressure Support - Cannot attach notes to unvalidated device data  FiO2  - Cannot attach notes to unvalidated device data  SpO2 - Cannot attach notes to unvalidated device data  Sensitivity - Cannot attach notes to unvalidated device data  PEEP/CPAP - Cannot attach notes to unvalidated device data  Peak Inspiratory Pressure - Cannot attach notes to unvalidated device data  Mean Airway Pressure - Cannot attach notes to unvalidated device data  Rate Measured - Cannot attach notes to unvalidated device data  Vt Exhaled - Cannot attach notes to unvalidated device data  Minute Volume - Cannot attach notes to unvalidated device data  I:E Ratio - Cannot attach notes to unvalidated device data  Pulse - Cannot attach notes to unvalidated device data  Resp - Cannot attach notes to unvalidated device data  High Pressure Alarm - Cannot attach notes to unvalidated device data  Low Minute Volume Alarm - Cannot attach notes to unvalidated device data  High Respiratory Rate - Cannot attach notes to unvalidated device data       12/08/21 1204   Vent Information   Vent Type 980   Vent Mode CPAP   Humidification Source HME   Vent Patient Data   Spontaneous  mL   Alarm Settings   Low Exhaled Vt  200 mL   ETT (adult)   Placement Date/Time: 12/07/21 1529   Tube Size: 7.5 mm  Location: Oral  Secured at: 24 cm  Placed By: In ED  Measured From: Lips   Secured at 24 cm   Measured From Lips   ET Placement Left   Secured By [employer] tube steen   Site Condition Dry   Cuff Pressure 30 cm H2O

## 2021-12-08 NOTE — PROGRESS NOTES
12/08/21 1204   Vent Information   Vent Type 980   Vent Mode CPAP   Pressure Support 10 cmH20   FiO2  50 %   SpO2 92 %   SpO2/FiO2 ratio 184   Sensitivity 2   PEEP/CPAP 5   Humidification Source HME   Vent Patient Data   Peak Inspiratory Pressure 18 cmH2O   Mean Airway Pressure 6.6 cmH20   Rate Measured 20 br/min   Vt Exhaled 395 mL   Spontaneous  mL   Minute Volume 8.19 Liters   I:E Ratio 1:5.80   Spontaneous Breathing Trial (SBT) RT Doc   Pulse 115   RSBI Calculated 49.63   Additional Respiratory  Assessments   Resp 21   Alarm Settings   High Pressure Alarm 45 cmH2O   Low Minute Volume Alarm 2 L/min   High Respiratory Rate 40 br/min   Low Exhaled Vt  200 mL   ETT (adult)   Placement Date/Time: 12/07/21 1529   Tube Size: 7.5 mm  Location: Oral  Secured at: 24 cm  Placed By: In ED  Measured From: Lips   Secured at 24 cm   Measured From Lips   ET Placement Left   Secured By [employer] tube steen   Site Condition Dry   Cuff Pressure 30 cm H2O

## 2021-12-08 NOTE — CARE COORDINATION
CASE MANAGEMENT INITIAL ASSESSMENT      Reviewed chart and completed assessment with  at bedside. Patient sedated on Vent. Explained Case Management role/services. Primary contact information:as below    Health Care Decision Maker :   Primary Decision Maker: Patricio Lindsey Spouse - [phone]    Secondary Decision Maker: Neha Pro - Niece/Nephew - [phone]    Supplemental (Other) Decision Maker: Marcelina Domínguez - Child - [phone]          Can this person be reached and be able to respond quickly, such as within a few minutes or hours? Yes     Admit date/status:IPA 12/7/21    Diagnosis:cardiac arrest    Is this a Readmission?:  No      Insurance:[employer]     Precert required for SNF: Yes       3 night stay required: No    Living arrangements, Adls, care needs, prior to admission:Lives with  in 2 story home. IPTA. Transportation:self    Durable Medical Equipment at home:  Walker__Cane__RTS__ BSC__Shower Chair__  02__ HHN__ CPAP__  BiPap__  Hospital Bed__ W/C___ Other___none_______    Services in the home and/or outpatient, prior to [address] (if applicable)   · Name:  · Address:  · Dialysis Schedule:  · Phone:  · Fax:    PT/OT recs:not yet ordered    Hospital Exemption Notification (HEN):needed if SNF    Barriers to discharge:none    Plan/comments:Undetermined. Patient currently critically ill in vent.  Will follow    ECOC on chart for MD signature Claudette Dresser, RN

## 2021-12-08 NOTE — PROCEDURES
Vas Cath Line Placement Procedure Note    Indication: need for CRRT    Consent: The spouse was counseled regarding the procedure, its indications, risks, potential complications and alternatives, and any questions were answered. Consent was obtained to proceed. Procedure: The patient was positioned appropriately and the skin over the right internal jugular vein was prepped with Chloraprep and draped in a sterile fashion. Local anesthesia was not performed due to the emergent nature of this procedure. A large bore needle was used to identify the vein under ultrasound guidance. A guide wire was then inserted into the vein through the needle. A 20 cm temporary dialysis catheter was then inserted into the vessel over the guide wire using the Seldinger technique to the 18 cm hong. All ports showed good, free flowing blood return and were flushed with saline solution. The catheter was then securely fastened to the skin with sutures and with an adhesive dressing and covered with a sterile dressing. A post procedure X-ray was ordered and showed good line position, ok for immediate use. The patient tolerated the procedure well. Complications: None. EBL less than 5cc. Mirna Bob, KASHMIR-CNP

## 2021-12-08 NOTE — CONSULTS
[facility] Consult Note           Reason for Consult:  Acute kidney injury  Requesting Physician:  Dr. Whitley Chu    Chief Complaint: Shortness of breath  History Obtained From:  patient, electronic medical record    History of Present Ilness:    73year old female with HTN, Asthma admitted with shortness of breath. We have been asked to assist in further mgmt of her HARLEEN. SCr 2.0 on admission, up to 2.6 on last check, urine output of 1.6 liters over last 24 hours, urine output decreased, last shift of 65 ml over last 24 hours. Underwent cardiac arrest in ER, requiring CPR, shocks X2. Underwent [facility] today s/p PCI of LAD. Hypotensive requiring increasing pressors. Requiring mechanical ventilation. Past Medical History:        Diagnosis Date    Asthma     Hypertension     past hx       Past Surgical History:        Procedure Laterality Date     SECTION      X 2    FRACTURE SURGERY Left 2017    ORIF wrist fx/ CTR    WRIST FRACTURE SURGERY Right 3/15/2021    OPEN REDUCTION INTERNAL FIXATION RIGHT DISTAL RADIUS -BLOCK- performed by Anneliese Lovett MD at [address] [place] [address] Left        Home Medications:    No current facility-administered medications on file prior to encounter.      Current Outpatient Medications on File Prior to Encounter   Medication Sig Dispense Refill    diphenhydrAMINE (BENADRYL) 25 MG capsule Take 50 mg by mouth every 6 hours as needed for Itching      Diphenhydramine-APAP, sleep, (EXCEDRIN PM PO) Take by mouth nightly as needed      ibuprofen (ADVIL;MOTRIN) 800 MG tablet Take 1 tablet by mouth every 8 hours as needed for Pain 60 tablet 3    albuterol sulfate  (90 BASE) MCG/ACT inhaler Inhale 2 puffs into the lungs every 6 hours as needed for Wheezing         Allergies:  Bactrim, Demeclocycline, Tetracyclines & related, Penicillins, and Sulfa antibiotics    Social History:    Social History Socioeconomic History    Marital status:      Spouse name: Not on file    Number of children: Not on file    Years of education: Not on file    Highest education level: Not on file   Occupational History    Not on file   Tobacco Use    Smoking status: Former Smoker     Quit date: 3/12/2014     Years since quittin.7    Smokeless tobacco: Never Used   Substance and Sexual Activity    Alcohol use: Yes     Comment: 1-2 drinks per year    Drug use: Yes     Frequency: 7.0 times per week     Types: Marijuana (Weed)     Comment: last time yesterday    Sexual activity: Not on file   Other Topics Concern    Not on file   Social History Narrative    Not on file     Social Determinants of Health     Financial Resource Strain:     Difficulty of Paying Living Expenses: Not on file   Food Insecurity:     Worried About Running Out of Food in the Last Year: Not on file    Edith of Food in the Last Year: Not on file   Transportation Needs:     Lack of Transportation (Medical): Not on file    Lack of Transportation (Non-Medical):  Not on file   Physical Activity:     Days of Exercise per Week: Not on file    Minutes of Exercise per Session: Not on file   Stress:     Feeling of Stress : Not on file   Social Connections:     Frequency of Communication with Friends and Family: Not on file    Frequency of Social Gatherings with Friends and Family: Not on file    Attends Anab[employer]ist Services: Not on file    Active Member of [address] [place] [address] or Organizations: Not on file    Attends Club or Organization Meetings: Not on file    Marital Status: Not on file   Intimate Partner Violence:     Fear of Current or Ex-Partner: Not on file    Emotionally Abused: Not on file    Physically Abused: Not on file    Sexually Abused: Not on file   Housing Stability:     Unable to Pay for Housing in the Last Year: Not on file    Number of [place] in the Last Year: Not on file    Unstable Housing in the Last Year: Not on file       Family History:   Family History   Problem Relation Age of Onset    Diabetes Mother     Heart Disease Father        Review of Systems:   Unable to obtain as patient intubated, sedated.     Physical exam:   Constitutional:  VITALS:  BP 92/68   Pulse 121   Temp 100.6 °F (38.1 °C) (Bladder)   Resp 23   Ht 5' 6\" (1.676 m)   Wt 151 lb 10.8 oz (68.8 kg)   SpO2 94%   BMI 24.48 kg/m²   Gen: intubated, sedated  Skin: no rash, turgor wnl  Heent:  Eomi, intubated  Neck: no bruits or jvd noted, thyroid normal  Cardiovascular:  S1, S2 without m/r/g  Respiratory: CTA B without w/r/r; respiratory effort normal  Abdomen:  +bs, soft, nt, nd, no hepatosplenomegaly  Ext: no lower extremity edema  Psychiatric: mood and affect limited; judgement and insight limited  Musculoskeletal:  Rom, muscular strength limited; digits, nails normal    Data/  CBC:   Lab Results   Component Value Date    WBC 16.3 12/08/2021    RBC 3.57 12/08/2021    HGB 9.1 12/08/2021    HCT 29.0 12/08/2021    MCV 90.2 12/08/2021    MCH 30.3 12/08/2021    MCHC 33.6 12/08/2021    RDW 14.0 12/08/2021    PLT 98 12/08/2021    MPV 9.2 12/08/2021     BMP:    Lab Results   Component Value Date     12/08/2021    K 4.7 12/08/2021    CL 97 12/08/2021    CO2 17 12/08/2021    BUN 69 12/08/2021    LABALBU 2.5 12/07/2021    CREATININE 2.6 12/08/2021    CALCIUM 8.1 12/08/2021    GFRAA 22 12/08/2021    GFRAA >60 02/22/2012    LABGLOM 18 12/08/2021    GLUCOSE 86 12/08/2021         Assessment/    - Acute kidney injury - ischemic ATN injury in setting of cardiac arrest    - Lactic acidosis    - Hyperkalemia    - Hyponatremia    - Cardiac arrest/Ischemic cardiomyopathy s/p PCI of LAD    - Shock - multifactorial      Plan/    - IVF trial with bicarbonate drip  - Favor dialysis for acidosis control - start CRRT with even fluid balance  - Critical care consult for vascath placement - appreciate assistance in advance  - Trend labs, bp's, & urine output    Case d/w ICU team & family who are agreeable to proceed    Thank you for the consultation. Please do not hesitate to call with questions. ____________________________________  Juanito Barboza MD  [facility]  [email or web address]  Office: [phone]

## 2021-12-08 NOTE — PROGRESS NOTES
Zaira Boswell admitted 2021 10:20 AM FOR sob, and feeling generally un-well. Had v-tach arrest in the ED and required shock x2, total event lasted 7 minutes. Taken urgently to the cath lab and received PCI to LAD. Arrived to ICU on vent, sedated. Patient seen and examined today during multidisciplinary ICU rounds. Groin sheaths removed this am and flat until 12:00  Intubated, sedated    Past Medical History:   Diagnosis Date    Asthma     Hypertension     past hx      Past Surgical History:   Procedure Laterality Date     SECTION      X 2    FRACTURE SURGERY Left 2017    ORIF wrist fx/ CTR    WRIST FRACTURE SURGERY Right 3/15/2021    OPEN REDUCTION INTERNAL FIXATION RIGHT DISTAL RADIUS -BLOCK- performed by Judy Phillips MD at [address] Left         BP 92/68   Pulse 116   Temp 100.6 °F (38.1 °C) (Bladder)   Resp 21   Ht 5' 6\" (1.676 m)   Wt 151 lb 10.8 oz (68.8 kg)   SpO2 95%   BMI 24.48 kg/m²     CVC Triple Lumen 21 Right Femoral (Active)   Placement Date/Time: 21 1300   Pre-existing: No  Timeout: Patient; Procedure; Appropriate Equipment; Consent Confirmed; Site prepped with chlorhexidine; Correct Position  Ultrasound guided: Yes  Hand Hygiene Performed Prior to Insertion: Yes  Si. .. ETT (adult) (Active)   Placement Date/Time: 21 1529   Tube Size: 7.5 mm  Location: Oral  Secured at: 24 cm  Placed By: In ED  Measured From: Lips       Plan:  MV D2 FiO2 50/5 sats in the mid 90's  Sedated with propofol/fentanyl. Attempt to lower sedation today but not following commands  Worsening hypotension, acidosis today. Low UOP and poorly responsive to fluids. Nephrology consulted for HARLEEN/acidosis. Anticipate will need CRRT  Titrating neosynephrine and vasopressin added for hemodynamics. Arterial line placed. CT head/chest/abd/pelvis ordered. Blood cultures sent. Procal elevated, febrile. Abx started.   H/H stable, trend q 6    Clinical update provided to patient's  and daughters. Discussed with Poli Stiles and Grant Juan today. DVT ppx: SCD  GI ppx: protonix      Mirna Chu, APRN-CNP

## 2021-12-08 NOTE — PROGRESS NOTES
12/08/21 1422   Vent Information   Vent Type 980   Vt Ordered 400 mL   Rate Set 23 bmp   Peak Flow 60 L/min   Pressure Support 0 cmH20   FiO2  60 %   Sensitivity 2   PEEP/CPAP 5   Vent Patient Data   Peak Inspiratory Pressure 36 cmH2O   Mean Airway Pressure 11 cmH20   Rate Measured 23 br/min   Vt Exhaled 413 mL   Minute Volume 9.27 Liters   I:E Ratio 1:2.60   Spontaneous Breathing Trial (SBT) RT Doc   Pulse 121   Additional Respiratory  Assessments   Resp 23   Alarm Settings   High Pressure Alarm 45 cmH2O   Low Minute Volume Alarm 2 L/min   High Respiratory Rate 40 br/min     SBT aborted by NP

## 2021-12-08 NOTE — ACP (ADVANCE CARE PLANNING)
Advance Care Planning   Advance Care Planning Clinical Specialist  Conversation Note      Date of ACP Conversation: 12/8/2021    Conversation Conducted with:    Titi Mock at bedside. Patient post cardiac arrest on vent. Healthcare Decision Maker: Next of Kin by law (only applies in absence of above) as below spouse Addison Head. Patient has no AD's    ACP Clinical Specialist: Adriana Ruiz RN      *When Decision Maker makes decisions on behalf of the incapacitated patient: Decision Maker is asked to consider and make decisions based on patient values, known preferences, or best interests. Current Designated Health Care Decision Maker:   Primary Decision Maker: [place] [phone]    Secondary Decision Maker: Radha Black - Niece/Nephew - [phone]    Supplemental (Other) Decision Maker: Lou Rich - Child - [phone]  (as entered in [address] field. Validate  this information as still accurate & up-to-date; edit [address] field as needed.)   For below questions, when conducting conversation with Xena, substitute \"she\" and \"her\" for \"you\" and \"your\". Hospitalization  If your health were to worsen and it became clear that your chance of recovery was unlikely, what would your preferences be regarding hospitalization?:    Choice:  [x]  The patient would want hospitalization  []  The patient would prefer comfort-focused treatment without hospitalization. Ventilation  If you were in your present state of health and suddenly became very ill and were unable to breathe on your own, what would your preference be about the use of a ventilator (breathing machine) if it were available to you? If patient would desire the use of a ventilator (breathing machine), answer \"yes\", if not \"no\":yes    If your health were to worsen and it became clear that your chance of recovery was unlikely, would that change your answer? Yes    Resuscitation  CPR works best to restart the heart when there is a sudden event, like a heart attack, in someone who is otherwise healthy. Unfortunately, CPR does not typically restart the heart for people who have serious health conditions or who are very sick. In the event your heart stopped, would you want attempts to restart your heart (answer \"yes\") or would you prefer a natural death (answer \"no\")? yes    If your health were to worsen and it became clear that your chance of recovery was unlikely, would that change your answer? Yes    [x] Yes  [] No   Educated Patient / Aneta Meyer regarding differences between Advance Directives and portable DNR orders.     Length of ACP Conversation in minutes:  30 minutes    Conversation Outcomes:  [] ACP discussion completed  [] Existing advance directive reviewed with patient; no changes to patient's previously recorded wishes   [] New Advance Directive completed   [] Portable Do Not Rescitate prepared for Provider review and signature  [] POLST/POST/MOLST/MOST prepared for Provider review and signature      Follow-up plan:    [] Schedule follow-up conversation to continue planning  [] Referred individual to Provider for additional questions/concerns   [] Advised patient/agent/surrogate to review completed ACP document and update if needed with changes in condition, patient preferences or care setting     [] This note routed to one or more involved healthcare providers     Adore Brewster RN  ACP Clinical Specialist

## 2021-12-08 NOTE — PROGRESS NOTES
[address] [facility] NP notified of low UOP and increase in pressor requirement, post 500ml fluid bolus. Slight concern noted for retroperitoneal bleed; although left femoral site remains soft, no oozing or bleeding, and no posterior bruising noted. BLE remain mottled and cool to the touch. Orders received to add maintenance fluids at 75ml/hr and obtain and Hemoglobin/hematocrit. If H&H is dropped, will consider CT abd/pelvis. Propofol d/hans at 1145, at this time (1300) pt is agitated, kicking legs, no commands following, pt eyes are open but no purposeful tracking noted. See MAR for propofol and fentanyl titration. Orders received to add precedex gtt.

## 2021-12-08 NOTE — PROGRESS NOTES
12/8/21 0314    . Will notify cardiology that pt's ACT has been >175 and left fem sheath remains in place. 12/8/21 0326    Dr. Warren Neighbours returned call from cardiology and aware of pt's ACT remaining >175. Dr. Warren Neighbours keep left fem sheath in and check ACT in an hour.

## 2021-12-08 NOTE — PROGRESS NOTES
. Left femoral arterial sheath removed by Eleuterio TOBIAS at 0485. Manual pressure held for at 15 minutes when bleeding stopped. No hematoma noted. Left femoral site soft, small bruising noted. Left femoral venous sheath removed by writer. Pressure held for at least 10 minutes until bleeding stop. Site without hematoma noted, soft. Applied occlusive dressing to monitor sites per protocol. Palpable pedal pulse noted. See flowsheet for frequent vitals and assessments.

## 2021-12-08 NOTE — PROGRESS NOTES
12/08/21 0815   Vent Information   Vent Type 980   Vent Mode AC/VC   Vt Ordered 400 mL   Rate Set 16 bmp   Peak Flow 60 L/min   Pressure Support 0 cmH20   FiO2  50 %   SpO2 95 %   SpO2/FiO2 ratio 190   Sensitivity 3   PEEP/CPAP 5   I Time/ I Time % 0 s   Humidification Source HME   Vent Patient Data   Peak Inspiratory Pressure 34 cmH2O   Mean Airway Pressure 11 cmH20   Rate Measured 16 br/min   Vt Exhaled 472 mL   Minute Volume 7.76 Liters   I:E Ratio 1:4.10   Cough/Sputum   Sputum How Obtained Cough on request; Oral tracheal   Cough Weak   Sputum Amount Moderate   Sputum Color Tan; Brown   Tenacity Thick; Thin   Spontaneous Breathing Trial (SBT) RT Doc   Pulse 124   Breath Sounds   Right Upper Lobe Inspiratory Wheezes; Expiratory Wheezes   Right Middle Lobe Inspiratory Wheezes; Expiratory Wheezes   Right Lower Lobe Diminished   Left Upper Lobe Inspiratory Wheezes;  Expiratory Wheezes   Left Lower Lobe Diminished   Additional Respiratory  Assessments   Resp 16   Position Reverse Trendelenburg   Alarm Settings   High Pressure Alarm 45 cmH2O   Low Minute Volume Alarm 2 L/min   High Respiratory Rate 40 br/min   Low Exhaled Vt  200 mL   ETT (adult)   Placement Date/Time: 12/07/21 1529   Tube Size: 7.5 mm  Location: Oral  Secured at: 24 cm  Placed By: In ED  Measured From: Lips   Secured at 24 cm   Measured From Lips   ET Placement Right   Secured By Commercial tube steen   Site Condition Dry   Cuff Pressure 30 cm H2O   ambu/sx hob

## 2021-12-08 NOTE — PROGRESS NOTES
12/08/21 0936   Vent Information   Vent Type 980   Vent Mode CPAP   Pressure Support 10 cmH20   FiO2  50 %   SpO2 94 %   SpO2/FiO2 ratio 188   Sensitivity 2   PEEP/CPAP 5   Vent Patient Data   Peak Inspiratory Pressure 18 cmH2O   Mean Airway Pressure 6.5 cmH20   Rate Measured 19 br/min   Vt Exhaled 504 mL   Spontaneous  mL   Minute Volume 9.6 Liters   I:E Ratio 1:5.80   Spontaneous Breathing Trial (SBT) RT Doc   Pulse 131   RSBI Calculated 37.7   Additional Respiratory  Assessments   Resp 19   Alarm Settings   High Pressure Alarm 45 cmH2O   Low Minute Volume Alarm 2 L/min   High Respiratory Rate 40 br/min     Placed to SBT by NP PS of 10

## 2021-12-08 NOTE — PROGRESS NOTES
12/07/21 2113   Vent Information   Vent Type 980   Vent Mode AC/VC+   Vt Ordered 400 mL   Rate Set 16 bmp   Pressure Support 0 cmH20   FiO2  60 %   Sensitivity 3   PEEP/CPAP 5   I Time/ I Time % 0 s   Humidification Source HME   Vent Patient Data   Peak Inspiratory Pressure 9 cmH2O   Mean Airway Pressure 6.1 cmH20   Rate Measured 22 br/min   Vt Exhaled 515 mL   Minute Volume 11.4 Liters   I:E Ratio 1:2.70   Cough/Sputum   Sputum How Obtained Endotracheal   Cough Congested; Productive   Sputum Amount Small   Sputum Color Cloudy   Spontaneous Breathing Trial (SBT) RT Doc   Pulse 119   Breath Sounds   Right Upper Lobe Clear   Right Middle Lobe Diminished   Right Lower Lobe Diminished   Left Upper Lobe Clear   Left Lower Lobe Diminished   Additional Respiratory  Assessments   Resp 23   Position Supine   Cuff Pressure (cm H2O) 30 cm H2O   Alarm Settings   High Pressure Alarm 45 cmH2O   Low Minute Volume Alarm 2 L/min   High Respiratory Rate 40 br/min   Patient Observation   Observations ambu at bedside   ETT (adult)   Placement Date/Time: 12/07/21 1529   Tube Size: 7.5 mm  Location: Oral  Secured at: 25 cm  Placed By: In ED  Measured From: Lips   Secured at 25 cm   Measured From [address] By [employer] tube steen   Site Condition Dry   Cuff Pressure 30 cm H2O

## 2021-12-09 NOTE — PROGRESS NOTES
called and updated on pt status. Updated on how unstable pt is and how much vasopressors pt is requiring. Updated that acidosis is not correcting regardless of treatments.  stated that he will be up around 0900. All questions and concerns addressed.

## 2021-12-09 NOTE — PROGRESS NOTES
Hospitalist Progress Note      PCP: No primary care provider on file. Date of Admission: 12/7/2021      Chief Complaint: malaise     Hospital Course:   73 y. o. female with hx of htn, asthma who presented to [facility] with ongoing malaise and p/w SOB to A. During workup, pt had cardiac arrest and cards consulted([facility] done). Pt placed in ICU.     Subjective: pt remains critically ill despite maximal medical tx , appears to be declining       Medications:  Reviewed    Infusion Medications    IV infusion builder 250 mL/hr at 12/09/21 0617    dexmedetomidine 1 mcg/kg/hr (12/09/21 1980)    vasopressin (Septic Shock) infusion 0.03 Units/min (12/09/21 0803)    norepinephrine 90 mcg/min (12/09/21 0600)    prismaSATE BGK 4/2.5 2,000 mL/hr at 12/09/21 0702    prismaSATE BGK 4/2.5 1,000 mL/hr at 12/09/21 0702    prismaSATE BGK 4/2.5 500 mL/hr at 12/09/21 0461    phenylephrine (SHAE-SYNEPHRINE) 50mg/250mL infusion 300 mcg/min (12/09/21 0601)    sodium chloride      fentaNYL (SUBLIMAZE) infusion Stopped (12/08/21 1415)    propofol Stopped (12/08/21 1143)    sodium chloride       Scheduled Medications    mupirocin   Nasal BID    ipratropium-albuterol  1 ampule Inhalation Q4H WA    cefTRIAXone (ROCEPHIN) IV  1,000 mg IntraVENous Q24H    metroNIDAZOLE  500 mg IntraVENous Q8H    sodium bicarbonate        sodium chloride flush  5-40 mL IntraVENous 2 times per day    pantoprazole  40 mg IntraVENous Daily    carboxymethylcellulose PF  1 drop Both Eyes 6 times per day    chlorhexidine  15 mL Mouth/Throat BID    sodium chloride flush  5-40 mL IntraVENous 2 times per day    rosuvastatin  40 mg Oral Nightly    aspirin  81 mg Oral Daily    ticagrelor  90 mg Oral BID     PRN Meds: magnesium sulfate, potassium chloride, perflutren lipid microspheres, dextrose, magnesium sulfate, sodium phosphate IVPB **OR** sodium phosphate IVPB **OR** sodium phosphate IVPB **OR** sodium phosphate IVPB, calcium gluconate **OR** calcium gluconate **OR** calcium gluconate **OR** calcium gluconate, sodium chloride flush, sodium chloride, ondansetron **OR** ondansetron, polyethylene glycol, acetaminophen **OR** acetaminophen, fentanNYL, sodium chloride flush, sodium chloride      Intake/Output Summary (Last 24 hours) at 12/9/2021 0809  Last data filed at 12/9/2021 0400  Gross per 24 hour   Intake 3656 ml   Output 310 ml   Net 3346 ml       Physical Exam Performed:    BP (!) 94/54   Pulse 88   Temp 92.8 °F (33.8 °C) (Bladder)   Resp 19   Ht 5' 6\" (1.676 m)   Wt 166 lb 3.6 oz (75.4 kg) Comment: with cooling blanket   SpO2 (!) 86%   BMI 26.83 kg/m²       General appearance:  No apparent distress, appears stated age and cooperative. Intubated/sedated  HEENT:  Normal cephalic, atraumatic without obvious deformity. Pupils fixed and dilated. Neck: Supple, with full range of motion. No jugular venous distention. Trachea midline. Respiratory:  Normal respiratory effort. Clear to auscultation, bilaterally without Rales/Wheezes/Rhonchi. Cardiovascular:  Regular rate and rhythm with normal S1/S2 without murmurs, rubs or gallops. Abdomen: Soft, non-tender, non-distended with normal bowel sounds. Musculoskeletal:  No clubbing, cyanosis or edema bilaterally.  Full range of motion without deformity. Skin: Skin mottled diffusely  Neurologic:  Neurovascularly intact without any focal sensory/motor deficits.  Cranial nerves: II-XII intact, grossly non-focal.  Psychiatric:  Alert and oriented x0, sedated/intubated  Capillary Refill: Brisk,3 seconds, normal  Peripheral Pulses: +2 palpable, equal bilaterally       Labs:   Recent Labs     12/07/21  1309 12/07/21  1330 12/08/21  0540 12/08/21  1357 12/08/21  1844 12/09/21  0045 12/09/21  0540   WBC 21.2*  --  16.3*  --   --   --  24.4*   HGB 11.9*   < > 10.8*   < > 8.7* 7.6* 6.7*   HCT 36.9   < > 32.2*   < > 27.1* 23.6* 21.0*     --  98*  --   --   --  65*    < > = values in this interval not displayed. Recent Labs     12/08/21  1137 12/08/21  1844 12/09/21  0045   * 133* 136   K 4.7 5.1 4.2   CL 97* 95* 96*   CO2 17* 14* 17*   BUN 69* 69* 48*   CREATININE 2.6* 3.3* 2.2*   CALCIUM 8.1* 7.5* 7.1*   PHOS  --  8.9* 6.1*     Recent Labs     12/07/21  1309   *   *   BILITOT <0.2   ALKPHOS 123     No results for input(s): INR in the last 72 hours. Recent Labs     12/08/21  1137 12/08/21  1844 12/09/21  0045   TROPONINI 0.13* 0.16* 0.19*       Urinalysis:      Lab Results   Component Value Date    BLOODU NEG 02/22/2012    SPECGRAV 1.030 02/22/2012    GLUCOSEU NEG 02/22/2012       Radiology:  XR CHEST PORTABLE   Final Result   Right internal jugular catheter terminates near junction of SVC and the right   atrium      Otherwise, unchanged chest demonstrating multifocal airspace disease         CT CHEST ABDOMEN PELVIS WO CONTRAST   Preliminary Result   Extensive multifocal airspace consolidation with multiple areas of   cavitation. Findings are consistent with ernesto cute infectious etiology to   include septic emboli. Malignant etiologies are significantly less likely. Persistent enhancement of the renal parenchyma bilaterally (persistent   nephrogram). Although nonspecific this can be seen with systemic   hypotension, renal failure, and other systemic pathologies. CT HEAD WO CONTRAST   Final Result   No acute intracranial abnormality. XR CHEST PORTABLE   Final Result   No acute process. Endotracheal tube in satisfactory position above the ashlee         XR CHEST PORTABLE   Final Result   No evidence of acute cardiopulmonary process.                  Assessment/Plan:    Active Hospital Problems    Diagnosis     Cardiac arrest [facility]) [I46.9]     Coronary artery disease involving native coronary artery of native heart with angina pectoris ([facility] [address] [I25.119]     Ischemic cardiomyopathy [I25.5]          Cardiac arrest- witnessed in ER, with ROSC, concern for stemi upon recovery(pt apparently had intact neuro fcn upon recovery)  -cards on board, taken to [facility](ROBBIN placed to LAD, noted chronic 3v disease)  -ICU care  -[address] placed 12/7 in ER  -neurochecks ordered   -echo ordered(ef 45%, hk noted, g1 dd  -on brilinta/asa/statin     Shock- worsening, likely from cardiac and possibly septic, suspect element of ischemic bowel given lactate of 17  -on iv abx  -pressors as needed   -CTc/a/p obtained, noted pulm consolidations with multiple areas of cavitation(concern for septic emboli )    Acute resp failure- due to above, intubated 12/7  -pulm/cc consulted, apprec mgmt of vent  -on sedation   -started on rocephin/flagyl 12/8 given fevers/elevated procal of >100     Acute encephalopathy- suspected element of anoxic injury as of 12/9  -will need to reeval when more stable   -CThead was neg for acute pathology    Anemia- concern for acute blood loss given drop from 12 to 6.7  -2u prbc ordered 12/9    Cardiomyopathy- likely ischemic  -mgmt per cards  -start bb/acei when able     Hyponatremia/AG Metabolic acidosis/HARLEEN- developed after arrest  -nephro consulted, apprec mgmt   -started on CRRT    HTN- held all home meds     CAD- per emr     Asthma--on prn albuterol at home  -duonebs prn ordered here     GI ppx: iv protonix  DVT Prophylaxis: renal dose lovenoxs  Diet: Diet NPO  Code Status: Full Code    PT/OT Eval Status: not possible    Dispo - icu care, very poor prognosis, family coming in today per CC team    Rickie Islas MD

## 2021-12-09 NOTE — PROGRESS NOTES
Isaias Thompson MD adjusted ventilator and bicarb gtt rate. See orders for details. RT notified of ventilator changes. Will implement orders and continue to monitor.

## 2021-12-09 NOTE — PROGRESS NOTES
SCD  GI ppx: protonix    Mirna Gonzalez, APRN-CNP    Addendum: 12:30  Family decided for comfort measures and terminal extubation. DNR-CC  Please see nursing notes for TOD.

## 2021-12-09 NOTE — PROGRESS NOTES
12/09/21 0350   Vent Information   Vent Type 980   Vent Mode AC/VC+   Vt Ordered 450 mL   Rate Set 30 bmp   Pressure Support 0 cmH20   FiO2  60 %   Sensitivity 2   PEEP/CPAP 8   I Time/ I Time % 0.7 s   Humidification Source HME   Vent Patient Data   Peak Inspiratory Pressure 43 cmH2O   Mean Airway Pressure 19 cmH20   Rate Measured 30 br/min   Vt Exhaled 475 mL   Minute Volume 14 Liters   I:E Ratio 1:1.90   Spontaneous Breathing Trial (SBT) RT Doc   Pulse 89   Breath Sounds   Right Upper Lobe Diminished   Right Middle Lobe Diminished   Right Lower Lobe Diminished   Left Upper Lobe Diminished   Left Lower Lobe Diminished   Additional Respiratory  Assessments   Resp 20   Alarm Settings   High Pressure Alarm 50 cmH2O   Low Minute Volume Alarm 2 L/min   High Respiratory Rate 40 br/min   Low Exhaled Vt  200 mL   ETT (adult)   Placement Date/Time: 12/07/21 1529   Tube Size: 7.5 mm  Location: Oral  Secured at: 24 cm  Placed By: In ED  Measured From: Lips   Secured at 24 cm   Measured From Lips   ET Placement Right   Secured By Commercial tube steen   Site Condition Dry   Cuff Pressure 32 cm H2O

## 2021-12-09 NOTE — PROGRESS NOTES
Dr Andrew Herrera from nephrology returned page. Updated on pt status and current ABG results. MD ordered 150mEq IVPf bicarb to be given and increase bicarb gtt to 150/hr. MD would like recheck ABG to be drawn in 2hrs. Will implement orders and continue to monitor.

## 2021-12-09 NOTE — PROGRESS NOTES
K+ is 6. Dr. Hodges Ip paged and new orders placed. Spiritual care bedside with family. CRRT access negative. Attempted to return blood. Was able to give back 41 mL. Restarted at 1100.

## 2021-12-09 NOTE — PROGRESS NOTES
Willow Resendiz MD responded with new orders, see orders for details. Will implement orders and continue to monitor.

## 2021-12-09 NOTE — PROGRESS NOTES
Family chose [facility] and writer has been calling them for hours, was finally informed that they are no longer open for business. Called  Lee Ann Kahn and he will find a new place tomorrow and call security to inform them. Writer called security and updated them.

## 2021-12-09 NOTE — PROGRESS NOTES
12/08/21 2003   Vent Information   Vent Type 980   Vent Mode AC/VC+   Vt Ordered 450 mL   Rate Set 30 bmp   Pressure Support 0 cmH20   FiO2  60 %   SpO2 (!) 23 %   SpO2/FiO2 ratio 38.33   Sensitivity 2   PEEP/CPAP 8   I Time/ I Time % 0.7 s   Vent Patient Data   Peak Inspiratory Pressure 40 cmH2O   Mean Airway Pressure 19 cmH20   Rate Measured 30 br/min   Vt Exhaled 463 mL   Minute Volume 13.9 Liters   I:E Ratio 1:1.90   Cough/Sputum   Sputum How Obtained Endotracheal   Cough None   Sputum Amount None   Sputum Color None   Tenacity None   Spontaneous Breathing Trial (SBT) RT Doc   Pulse 121   Breath Sounds   Right Upper Lobe Inspiratory Wheezes; Crackles   Right Middle Lobe Diminished   Right Lower Lobe Diminished   Left Upper Lobe Inspiratory Wheezes; Expiratory Wheezes;  Diminished; Crackles   Left Lower Lobe Diminished   Additional Respiratory  Assessments   Resp 30   Position Semi-Irizarry's   Cuff Pressure (cm H2O) 30 cm H2O   Alarm Settings   High Pressure Alarm 50 cmH2O   Low Minute Volume Alarm 2 L/min   Apnea (secs) 20 secs   High Respiratory Rate 40 br/min   Low Exhaled Vt  200 mL   Patient Observation   Observations ambu bag behind vent    ETT (adult)   Placement Date/Time: 12/07/21 1529   Tube Size: 7.5 mm  Location: Oral  Secured at: 24 cm  Placed By: In ED  Measured From: Lips   Secured at 24 cm   Measured From [address] By Commercial tube steen   Site Condition Dry

## 2021-12-09 NOTE — PROGRESS NOTES
12/09/21 0742   Vent Information   Equipment Changed HME   Vent Type 980   Vent Mode AC/VC+   Vt Ordered 500 mL   Rate Set 30 bmp   Pressure Support 0 cmH20   FiO2  40 %  (Pa02 162.7)   Sensitivity 2   PEEP/CPAP 5   I Time/ I Time % 0.7 s   Humidification Source HME   Vent Patient Data   Peak Inspiratory Pressure 41 cmH2O   Mean Airway Pressure 17 cmH20   Rate Measured 30 br/min   Vt Exhaled 516 mL   Minute Volume 15.5 Liters   I:E Ratio 1:1.90   Cough/Sputum   Sputum How Obtained Oral; Endotracheal; Suctioned   Cough Productive   Sputum Amount Large   Sputum Color Creamy   Tenacity Thick   Spontaneous Breathing Trial (SBT) RT Doc   Pulse 88   Breath Sounds   Right Upper Lobe Diminished   Right Middle Lobe Diminished   Right Lower Lobe Diminished   Left Upper Lobe Diminished   Left Lower Lobe Diminished   Additional Respiratory  Assessments   Resp 19   Position Supine   Oral Care Mouth suctioned   Cuff Pressure (cm H2O) 32 cm H2O   Alarm Settings   High Pressure Alarm 50 cmH2O   Low Minute Volume Alarm 2 L/min   High Respiratory Rate 40 br/min   Low Exhaled Vt  200 mL   Patient Observation   Observations ambu bag and mask at bedside, alarms on and audible, apnea parameters on, hhn with [id number]   ETT (adult)   Placement Date/Time: 12/07/21 1529   Tube Size: 7.5 mm  Location: Oral  Secured at: 24 cm  Placed By: In ED  Measured From: Lips   Secured at 24 cm   Measured From [facility] [address] By Commercial tube steen   Site Condition Dry   Cuff Pressure 32 cm H2O

## 2021-12-09 NOTE — PROGRESS NOTES
Call made to [facility]. They are looking into her case and will call me back. Family bedside and very distraught at condition. pulm team notified.

## 2021-12-09 NOTE — PROGRESS NOTES
Wasted 65mL of {Waste:[id number]} with Ishmael Chirinos RN. Med disposed of into the Rx Destroyer per protocol.     Primary Nurse eSignature: Electronically signed by Wade Llanos RN on 12/9/21 at 6:46 AM EST    **SHARE this note so that the co-signing nurse is able to place an eSignature**    Co-signer eSignature: {Esignature:[id number]}

## 2021-12-09 NOTE — PROGRESS NOTES
4 Eyes Skin Assessment     The patient is being assess for   Shift Handoff    I agree that 2 RN's have performed a thorough Head to Toe Skin Assessment on the patient. ALL assessment sites listed below have been assessed. Areas assessed for pressure by both nurses:   [x]   Head, Face, and Ears   [x]   Shoulders, Back, and Chest, Abdomen  [x]   Arms, Elbows, and Hands   [x]   Coccyx, Sacrum, and Ischium  [x]   Legs, Feet, and Heels        Skin Assessed Under all Medical Devices by both nurses:  ETT, spivey and OG              All Mepilex Borders were peeled back and area peeked at by both nurses:  Yes  Please list where Mepilex Borders are located:  sacral             **SHARE this note so that the co-signing nurse is able to place an eSignature**    Co-signer eSignature: [email or web address]    Does the Patient have Skin Breakdown related to pressure?   No          Anupam Prevention initiated:  Yes   Wound Care Orders initiated:  NA      [facility] nurse consulted for Pressure Injury (Stage 3,4, Unstageable, DTI, NWPT, Complex wounds)and New or Established Ostomies:  NA      Primary Nurse eSignature: Electronically signed by Reina Bucio RN on 12/9/21 at 4:37 AM EST

## 2021-12-09 NOTE — PROGRESS NOTES
12/08/21 2323   Vent Information   Vent Type 980   Vent Mode AC/VC+   Vt Ordered 450 mL   Rate Set 30 bmp   Pressure Support 0 cmH20   FiO2  60 %   Sensitivity 2   PEEP/CPAP 8   I Time/ I Time % 0.7 s   Humidification Source HME   Vent Patient Data   Peak Inspiratory Pressure 44 cmH2O   Mean Airway Pressure 19 cmH20   Rate Measured 30 br/min   Vt Exhaled 490 mL   Minute Volume 14 Liters   I:E Ratio 1:1.90   Spontaneous Breathing Trial (SBT) RT Doc   Pulse 89   Breath Sounds   Right Upper Lobe Diminished   Right Middle Lobe Diminished   Right Lower Lobe Diminished   Left Upper Lobe Diminished   Left Lower Lobe Diminished   Additional Respiratory  Assessments   Resp 20   Alarm Settings   High Pressure Alarm 50 cmH2O   Low Minute Volume Alarm 2 L/min   High Respiratory Rate 40 br/min   Low Exhaled Vt  200 mL   ETT (adult)   Placement Date/Time: 12/07/21 1529   Tube Size: 7.5 mm  Location: Oral  Secured at: 24 cm  Placed By: In ED  Measured From: Lips   Secured at 24 cm   Measured From Lips   ET Placement Left   Secured By Commercial tube steen   Site Condition Dry   Cuff Pressure 30 cm H2O

## 2021-12-09 NOTE — FLOWSHEET NOTE
Responded to request for bedside presence as patient declined. Many family present, tearful and sharing feelings, supporting and embracing one another in comfort. Changed code status, decision to w/d care made by spouse & adult children. I provided prayer and a Psalm bedside with family. Family sharing words of comfort and encouragement 'we'll figure it out.'    I'm remaining on unit for further support as requested by family. Spouse Titi Sebastian and Dtr Madeline Villa are taking a few minutes to be alone in room at time of this note. Jimenez Dasilva  [phone]       12/09/21 1236   Encounter Summary   Services provided to: Family   Referral/Consult From: Nurse   Support System Spouse; Children; Family members; Friends/neighbors   Continue Visiting   (12/9: many family visiting, prayer & Psalm bedside, EOL)   Complexity of Encounter High   Length of Encounter 3 hours   Spiritual Assessment Completed Yes   Grief and Life Adjustment   Type End of life   Assessment Tearful; Grieving   Intervention Active listening; Prayer; Scripture; Explored feelings, thoughts, concerns;  Discussed meaning/purpose; Discussed belief system/Yazidi practices/belgica   Outcome Expressed gratitude

## 2021-12-09 NOTE — PROGRESS NOTES
Comprehensive Nutrition Assessment    Type and Reason for Visit:  Initial    Nutrition Recommendations/Plan:   Monitor ability to start TFs  1. If enteral nutrition indicated, recommend order \"Diet: Adult Tube Feed\". Initiate Nepro (renal formula) at 10 mL/hr and as tolerated, increase by 10 mL/hr q 4 hours until goal of 45 mL/hr. 2. Recommend 60 mL H20 q 4 hours. Recommend reevaluate IV fluids at this time. Increase flush if Na increases greater than 145 mEq/L.  3. Recommend 1 Bottle Proteinex P2Go daily via syringe. Flush with 30 mL H20 before and after. Proteinex P2Go should not be mixed directly with the tube feeding formula. Monitor nutrition adequacy, pertinent labs, bowel habits, wt changes, and clinical progress    Nutrition Assessment:  Pt admitted with cardiac arrest. S/p LHC. Now intubated. FiO2 40% and PEEP 5. On precedex for sedation. Levo, elijah, and vasopressin for hypotension. Requiring CRRT. Will monitor ability to start enteral feedings when appropriate. Malnutrition Assessment:  Malnutrition Status: At risk for malnutrition (Comment)      Estimated Daily Nutrient Needs:  Energy (kcal):  9566-8635 kcal; Weight Used for Energy Requirements:  Current (75 kg)     Protein (g):  75-90 g; Weight Used for Protein Requirements:  Current (1.2-2.0 g/kg)        Fluid (ml/day):   ; Method Used for Fluid Requirements:  1 ml/kcal      Nutrition Related Findings:  Hypoactive BS. No BM. OG. Hypoglycemic this AM. Lactic acid trending up. Phos 6.1  Mg 2.60  Na 136      Wounds:  None       Current Nutrition Therapies:    Diet NPO  Current Tube Feeding (TF) Orders:  · Feeding Route: Orogastric  · Formula: Renal Formula  · Schedule: Continuous  · Additives/Modulars: Protein  · Goal TF & Flush Orders Provides: Goal rate of Nepro at 45 mL/hr x20 hours to provide 900 mL TV, 1620 kcal, 73 g protein, and 654 mL free water.  +1 bottle of proteinex daily for additional 26 g protein and 104 kcal. +60 mL free water flush q 4 hrs      Anthropometric Measures:  · Height: 5' 6\" (167.6 cm)  · Current Body Weight: 166 lb (75.3 kg)   · Ideal Body Weight: 130 lbs; % Ideal Body Weight 127.7 %   · BMI: 26.8  · BMI Categories: Overweight (BMI 25.0-29. 9)       Nutrition Diagnosis:   · Inadequate energy intake related to inadequate protein-energy intake, impaired respiratory function as evidenced by NPO or clear liquid status due to medical condition, intubation      Nutrition Interventions:   Food and/or Nutrient Delivery:  Start Tube Feeding  Nutrition Education/Counseling:  No recommendation at this time   Coordination of Nutrition Care:  Continue to monitor while inpatient, Interdisciplinary Rounds    Goals: Tolerate TFs at goal rate this admission w/o GI distress       Nutrition Monitoring and Evaluation:   Behavioral-Environmental Outcomes:  None Identified   Food/Nutrient Intake Outcomes:  Enteral Nutrition Intake/Tolerance  Physical Signs/Symptoms Outcomes:  Biochemical Data, Hemodynamic Status, Nutrition Focused Physical Findings, Weight     Discharge Planning:     Too soon to determine     Electronically signed by Petty Bliss MS, RD, LD on 12/9/21 at 10:20 AM EST    Contact: [phone]

## 2021-12-09 NOTE — PROGRESS NOTES
Intensive Care Progress Note    Patient: Gabby Ryan MRN: [id number]  Date of  Admission: 12/7/2021   YOB: 1955  Age: 73 y.o. Sex: female    Unit: [place] [address] CVU  Room/Bed: 0231/0231-01 Attending Physician: Beryle Ganja, MD   Admitting Physician: Frantz Berman        Chief Complaint   Patient presents with    Shortness of Breath       EMS states patient room air O2 70-80s, improved with nasal cannula. not felt well since yesterday. Acute respiratory failure status post cardiac arrest     History Obtained From   electronic medical record     History of Present Illness   Is a 36-year-old female who came to the hospital because of shortness of breath. Patient was taken to Cath Lab. And then returned to the ICU on a ventilator. Patient had left heart cath done showing a lesion in 1 mid LAD and a stent placement. Patient had no active culprit lesion but does have three-vessel critical disease which is probably old as per cardiology. Cardiologist feels that three-vessel disease with severe supply/demand event that outpatient cardiac ability and likely became ischemic going into cardiac arrest.  Patient will have Integrilin for 6 hours, will have NG tube placed with aspirin and ticagrelor.   Patient is unable to give any history and is intubated    12/8/2021patient's overall status worsening, not awakening, needing CRRT, Vas-Cath placed, patient requiring more pressors overnight and unable to keep blood pressure elevated, having profound acidosis and unable to change despite multiple rounds of bicarbonate        Subjective:    Started on crrt last night    On vent  And increased pressor    Yesterday, was on SBT however acutely the pt decompensated, was unresponsive and then H/H was going down, got CT of head, chest and abd  CT of chest showed multiple infiltrative processes with cavitation  Increased PCT  Started on abx,  And acidosis worsened  vascath placed  And clinically worsening overnight  Patient was given multiple rounds of bicarbonate and now on a bicarb drip and still not able to change the acidosis      Initially cxr was clear of any acute cardiopulmonary disease  However ct scan showed severe disease  CT scan shows cavitary lesions throughout the lung along with infiltrative process throughout the lung    Overall over the night the patient's clinical situation extremely worrisome. Patient now having fixed and dilated eyes, unresponsive, mottled up to the waist, on 3 pressors,    Multiple rounds of bicarb along with a bicarb drip were given, uncontrolled and uncontrollable acidosis. Patient on CRRT      After talking to the family yesterday by the nurse practitioner, it was commented that the family knew that the patient did not like to see doctors and has not seen a doctor in about 2 years, has been feeling ill over the last several days to weeks prior to coming into the hospital, and not following with any primary care physician or physician. ROS:Review of systems not obtained due to patient factors. Objective: Intake and Output:   Current Shift:   No intake/output data recorded.   Last three shifts:   12/08 0701 - 12/09 0700  In: 3656 [I.V.:3656]  Out: 375 [Urine:75]    Vent settings for last 24 hours:  [unfilled]    Hemodynamic parameters for last 24 hours:  [unfilled]    Physical Exam:   Patient Vitals for the past 24 hrs:   BP Temp Temp src Pulse Resp SpO2 Weight   12/09/21 0742    88 19     12/09/21 0741     18     12/09/21 0600  92.8 °F (33.8 °C) Bladder 85 22     12/09/21 0500  92.2 °F (33.4 °C) Bladder 87 27     12/09/21 0400 (!) 94/54 (!) 91.7 °F (33.2 °C) Bladder 88 22     12/09/21 0353    89 24     12/09/21 0350    89 20     12/09/21 0300  (!) 90.7 °F (32.6 °C) Bladder 91 19     12/09/21 0200  (!) 90.6 °F (32.6 °C) Bladder 85 19     12/09/21 0100  (!) 91.4 °F (33 °C) Bladder 85 22 (!) 86 %    12/09/21 0000 116/61 92.1 °F (33.4 °C) Bladder 89 22     12/08/21 2323    89 20     12/08/21 2300  94.2 °F (34.6 °C) Bladder 83 13     12/08/21 2200  96.7 °F (35.9 °C) Bladder 89 16     12/08/21 2100  100.8 °F (38.2 °C) Bladder 120 30     12/08/21 2040       166 lb 3.6 oz (75.4 kg)   12/08/21 2003    121 30 (!) 23 %    12/08/21 2000 (!) 112/59 101.1 °F (38.4 °C) Bladder 123 29 (!) 32 %    12/1955     30 (!) 22 %    12/08/21 1945    123 30 (!) 18 %    12/08/21 1930    127 30 (!) 31 %    12/08/21 1915    127 30 (!) 15 %    12/08/21 1900    123 27 (!) 61 %    12/08/21 1845    126 30     12/08/21 1830    126 30 (!) 62 %    12/08/21 1815    126 30     12/08/21 1800  101.4 °F (38.6 °C) Bladder 125 28     12/08/21 1750    123 30 (!) 65 %    12/08/21 1745    123 28     12/08/21 1730    130 29     12/08/21 1715    125 25     12/08/21 1700    125 25     12/08/21 1645    135 26     12/08/21 1630    121 25     12/08/21 1615    119 25     12/08/21 1600    117 25 90 %    12/08/21 1545 (!) 94/58 101 °F (38.3 °C) Bladder 118 21     12/08/21 1530 88/76   117 25     12/08/21 1515 (!) 79/40   115 24     12/08/21 1512    116 21 95 %    12/08/21 1500 (!) 75/65   117 21 94 %    12/08/21 1430 (!) 63/48   119 23     12/08/21 1422    121 23     12/08/21 1415 93/65   121 21     12/08/21 1400 (!) 95/46   120 22     12/08/21 1345 (!) 66/31   120 21     12/08/21 1330 92/68   121 20     12/08/21 1315 (!) 128/116   133 25 94 %    12/08/21 1300 (!) 83/26   120 20 94 %    12/08/21 1245 (!) 92/54   120 20 92 %    12/08/21 1230 (!) 84/45   119 20     12/08/21 1215 91/61   116 20     12/08/21 1204    115 21 92 %    12/08/21 1200 (!) 83/53 100.6 °F (38.1 °C) Bladder 115 21 91 %    12/08/21 1145 (!) 84/54   117 20 91 %    12/08/21 1130 (!) 85/53   116 19 91 %    12/08/21 1115 (!) 83/49   117 19 91 %    12/08/21 1100 (!) 78/63   119 18     12/08/21 1045 (!) 85/44   124 18 91 %    12/08/21 1043    125 19 90 %    12/08/21 1042    124 19 91 %    12/08/21 1041    125 18 92 %    12/08/21 1040    125 18 93 %    12/08/21 1039    125 18 93 %    12/08/21 1038    126 18 93 %    12/08/21 1037    126 19 92 %    12/08/21 1030 97/68   128 19 93 %    12/08/21 1015 96/77   130 19 93 %    12/08/21 1000 99/77   131 20 92 %    12/08/21 0945 103/72   131 20 93 %    12/08/21 0936    131 19 94 %    12/08/21 0930 104/65   131 19 95 %    12/08/21 0915 (!) 89/53   130 15 94 %    12/08/21 0900 (!) 97/59   128 17 94 %    12/08/21 0845 (!) 96/48   127 17 94 %    12/08/21 0834     16 96 %    12/08/21 0830 (!) 88/54   125 16 92 %    12/08/21 0815 (!) 89/54   124 16 95 %             Physical Exam  Vitals and nursing note reviewed. Constitutional:       General: She is not in acute distress. Appearance: Normal appearance. She is obese. Comments: Sedated   HENT:      Head: Normocephalic and atraumatic. Right Ear: External ear normal.      Left Ear: External ear normal.      Nose: Nose normal.      Mouth/Throat:      Comments: ET tube in place  Eyes:      General:         Right eye: No discharge. Left eye: No discharge. Extraocular Movements: Extraocular movements intact. Comments: Eyes are fixed and dilated   Cardiovascular:      Rate and Rhythm: Regular rhythm. Tachycardia present. Pulses: Normal pulses. Heart sounds: No murmur heard. Pulmonary:      Effort: No respiratory distress. Breath sounds: No stridor. No wheezing, rhonchi or rales. Chest:      Chest wall: No tenderness. Abdominal:      General: Bowel sounds are normal. There is no distension. Palpations: Abdomen is soft. There is no mass. Tenderness: There is no abdominal tenderness. Hernia: No hernia is present. emboli  She is Covid negative    Patient on Rocephin and Flagyl  We will add vancomycin and cefepime  We will ask ID to see patient    May need MAURA    Autoimmune disease  Will get panel  Start on steroids      Discussed with nephrology and hospitalist and nursing about current situation prior to talking with family, overall the prognosis is now very grim      Had a discussion with , daughter, sister of the patient and brother of the patient  Discussed the fact that the patient has multiorgan failure with 100% mortality at this point  Explained to the patient's family that despite everything that we have done the patient is not responding to current therapy. And there is nothing medically that we can do further. We have given antibiotics, bicarbonate, started on CRRT, has been on a ventilator, has sedated, giving medications to keep blood pressure up. Talk to the family that sepsis can and an infection can cause this degree of problems very quickly and acutely. Explained to the family that she was actually clinically doing better yesterday morning however with the acute change in status and the CT scan findings that were very significantly worsened despite a normal chest x-ray upon presentation that the underlying issues may have been ongoing prior to coming into the hospital.  However there is nothing that we can look back to as the patient has not been seen by primary care physician or had any lab work done prior to admission or at least over the last several years.       Discussed in multidisciplinary rounds, with nursing, dietitian and pharmacy  [address] Provided:   This patient had a critical illness or injury that acutely impaired one or more vital organ systems such that there was a high probability of imminent or life-threatening deterioration in the patient's condition. This required high complexity decision-making to assess, manipulate, and support vital system function(s) to treat single or multiple vital organ system failure and/or to prevent further life-threatening deterioration of the patient's condition. Total attending physician time, excluding unbundled procedures, spent at the bedside, and away from the bedside but on the unit or floor, reviewing laboratory test results, discussing care with medical staff, and discussing care with family when the patient was unable or incompetent to participate:   Critical Care Time (Minutes) # 72   (Discontinuous)                          Jeimy Flores MD  [facility]  Pulmonary, Critical Care and Sleep Medicine  Office : [phone]    Please note that some or all of this record was generated using voice recognition software. If there are any questions about the content of this document, please contact the author as some errors in transcription may have occurred.

## 2021-12-09 NOTE — FLOWSHEET NOTE
21 1237   Encounter Summary   Services provided to: Family   Referral/Consult From: [address] [place] [address] Drive; Children; Family members   Continue Visiting   (: family discussing  home, cremat. only as pt wish)   Complexity of Encounter High   Length of Encounter 30 minutes   Routine   Type Follow up   Assessment Tearful; Grieving

## 2021-12-09 NOTE — PROGRESS NOTES
Assumed care of pt. Received report via IRAM RN. VSS with pt mechanically ventilated and on vasopressors for BP support. Pt tolerating mechanical ventilation with precedex gtt for sedation, see MAR for details. Will begin CRRT shortly, see further notes for details. 4 eye skin assessment complete. MAR handoff complete. Lines verified. Pt repositioned and resting comfortably. Shift assessment to follow. Will continue to monitor.

## 2021-12-09 NOTE — PROGRESS NOTES
[facility] Progress Note           Subjective/   73y.o. year old female who we are seeing in consultation for HARLEEN requiring CRRT.       HPI:  Seen on CRRT. Orders confirmed. Hypotensive, remains on pressors. ROS:  +fevers. , now hypothermic. Objective/   GEN:  Chronically ill, BP (!) 94/54   Pulse 88   Temp 92.8 °F (33.8 °C) (Bladder)   Resp 19   Ht 5' 6\" (1.676 m)   Wt 166 lb 3.6 oz (75.4 kg) Comment: with cooling blanket   SpO2 (!) 86%   BMI 26.83 kg/m²   HEENT: non-icteric, no JVD  CV: S1, S2 without m/r/g; no LE edema  RESP: CTA B without w/r/r; breathing wnl  ABD: +bs, soft, nt, no hsm  SKIN: warm, no rashes  ACCESS: LORRI stack (12/8)    Data/  Recent Labs     12/07/21  1309 12/07/21  1330 12/08/21  0540 12/08/21  1357 12/09/21  0045 12/09/21  0045 12/09/21  0540 12/09/21  0800 12/09/21  0802   WBC 21.2*  --  16.3*  --   --   --  24.4*  --   --    HGB 11.9*   < > 10.8*   < > 7.6*   < > 6.7* 6.1* 6.3*   HCT 36.9   < > 32.2*   < > 23.6*  --  21.0* 19.6*  --    MCV 91.9  --  90.2  --   --   --  93.3  --   --      --  98*  --   --   --  65*  --   --     < > = values in this interval not displayed. Recent Labs     12/08/21  0000 12/08/21  0540 12/08/21  1137 12/08/21  1844 12/09/21  0045   *   < > 131* 133* 136   K 3.3*   < > 4.7 5.1 4.2   CL 98*   < > 97* 95* 96*   CO2 20*   < > 17* 14* 17*   GLUCOSE 90   < > 86 54* 119*   PHOS  --   --   --  8.9* 6.1*   MG 2.60*  --   --  3.10* 2.60*   BUN 60*   < > 69* 69* 48*   CREATININE 1.6*   < > 2.6* 3.3* 2.2*   LABGLOM 32*   < > 18* 14* 22*   GFRAA 39*   < > 22* 17* 27*    < > = values in this interval not displayed.        Assessment/     - Acute kidney injury - ischemic ATN injury in setting of cardiac arrest, started CRRT 12/8     - Lactic acidosis     - Hyperkalemia     - Hyponatremia     - Cardiac arrest/Ischemic cardiomyopathy s/p PCI of LAD     - Shock - multifactorial       Plan/     - Continue CRRT, bicarb drip  - Trend labs, bp's, & urine output    Overall prognosis guarded  Case d/w ICU team    ____________________________________  Maritza Ochoa MD  [facility]  [email or web address]  Office: [phone]

## 2021-12-10 LAB
IGA: 103 MG/DL (ref 70–400)
IGG: 382 MG/DL (ref 700–1600)
IGM: 30 MG/DL (ref 40–230)
POC ACT LR: 232 SEC
POC ACT LR: 255 SEC
POC ACT LR: 357 SEC

## 2021-12-10 NOTE — DISCHARGE SUMMARY
Hospital Medicine Discharge Summary    Patient ID: Asya Burton      Patient's PCP: No primary care provider on file. Admitting Physician: Maria Guadalupe Millan MD  Discharge Physician: Maria Guadalupe Millan MD     Admit Date: 2021     Disposition:     Discharge Diagnoses: Active Hospital Problems    Diagnosis     Cardiac arrest [facility]) [I46.9]     Coronary artery disease involving native coronary artery of native heart with angina pectoris ([facility] [address] [I25.119]     Ischemic cardiomyopathy [I25.5]        Date of Death:21  Time of Death: 12:27pm    Immediate Cause of Death:Respiratory failure  Underlying Conditions: cardiac arrest, lactic acidosis, ARF  Other Contributing Conditions:HTN, CAD    Hospital Course:   History Of Present Illness:   Per emr as pt intubated/had cardiac arrest in ER     73 y.o. female with hx of htn, asthma who presented to [facility] with ongoing malaise. Pt had been using her inhaler of late due to ongoing SOB, per . Per ER staff, EMS was called and found pt to have hypoxia in 70s however pt was on room air when evaluated in ER. She was placed on oxygen and shortly after went into cardiac arrest.  Cardiology was present and upon ROSC, there was concern for STEMI, so pt was taken for LHC. Pt was transferred to ICU for further care.         Cardiac arrest- witnessed in ER, with ROSC, concern for stemi upon recovery(pt apparently had intact neuro fcn upon recovery)  -cards on board, taken to [facility](ROBBIN placed to LAD, noted chronic 3v disease)  -ICU care  -femoral CVC placed  in ER  -neurochecks ordered   -echo ordered(ef 45%, hk noted, g1 dd  -on brilinta/asa/statin     Shock- worsening, likely from cardiac and possibly septic, suspect element of ischemic bowel given lactate of 17  -was on iv abx  -pressors as needed   -CTc/a/p obtained, noted pulm consolidations with multiple areas of cavitation(concern for septic emboli )   -pt continued to worsen despite maximal medical therapy. Discussed with family and therefore decision made to withdraw care, pt  at above time/date    Acute resp failure- due to above, intubated   -pulm/cc consulted, apprec mgmt of vent  -on sedation   -started on rocephin/flagyl  given fevers/elevated procal of >100     Acute encephalopathy- suspected element of anoxic injury as of   -will need to reeval when more stable   -CThead was neg for acute pathology     Anemia- concern for acute blood loss given drop from 12 to 6.7  -2u prbc ordered      Cardiomyopathy- likely ischemic  -mgmt per cards  -start bb/acei when able     Hyponatremia/AG Metabolic acidosis with lactic acidosis/HARLEEN- developed after arrest  -nephro consulted, apprec mgmt   -started on CRRT     HTN- held all home meds     CAD- per emr     Asthma--on prn albuterol at home  -duonebs prn ordered here    Consults:  IP CONSULT TO CRITICAL CARE  IP CONSULT TO CARDIAC REHAB  IP CONSULT TO NEPHROLOGY  IP CONSULT TO PHARMACY  PHARMACY TO DOSE VANCOMYCIN  IP CONSULT TO INFECTIOUS DISEASES    Signed:  Farida Finch MD MD   2021    Thank you No primary care provider on file. for the opportunity to be involved in this patient's care. If you have any questions or concerns please feel free to contact me at [phone].

## 2021-12-10 NOTE — PROGRESS NOTES
Required reporting for death within 24hrs of removal of 2 point soft wrist restraints completed- added to facility database.

## 2021-12-11 LAB
ANTI-NUCLEAR ANTIBODY (ANA): NEGATIVE
ANTI-SS-A IGG: <0.2 AI (ref 0–0.9)
ANTI-SS-B IGG: <0.2 AI (ref 0–0.9)
REPORT: NORMAL

## 2021-12-12 LAB
BLOOD CULTURE, ROUTINE: NORMAL
EKG ATRIAL RATE: 140 BPM
EKG DIAGNOSIS: NORMAL
EKG P AXIS: 63 DEGREES
EKG P-R INTERVAL: 128 MS
EKG Q-T INTERVAL: 284 MS
EKG QRS DURATION: 100 MS
EKG QTC CALCULATION (BAZETT): 433 MS
EKG R AXIS: 94 DEGREES
EKG T AXIS: 36 DEGREES
EKG VENTRICULAR RATE: 140 BPM

## 2021-12-13 LAB
CULTURE, BLOOD 2: ABNORMAL
CULTURE, BLOOD 2: ABNORMAL
ORGANISM: ABNORMAL
ORGANISM: ABNORMAL

## 2021-12-14 LAB — ALPHA-1 ANTITRYPSIN: 203 MG/DL (ref 90–200)

## 2021-12-24 NOTE — PROGRESS NOTES
Physician Progress Note      Luther Perera  CSN #:                  [id number]  :                       1955  ADMIT DATE:       2021 10:20 AM  DISCH DATE:        2021 3:10 PM  RESPONDING  PROVIDER #:        Jamie Shultz MD          QUERY TEXT:    Patient admitted with shortness of breath and then suffered cardiac arrest.    DC Summary states, \"Cardiac arrest- witnessed in ER, with ROSC, concern for   stemi upon recovery\". If possible, please document in the progress notes and   discharge summary if you are evaluating and/or treating any of the following: The medical record reflects the following:  Risk Factors: CAD, HTN, dehydration  Clinical Indicators: troponin 0.13, 0.16, 0.19,  \"Patient with no active   culprit lesion but does have three-vessel critical disease\" and \" I theorized   that patient had critical underlying three-vessel disease and had a severe   supply/demand event \" per cardiology,  Treatment: [facility] with PCI, Cardiology consult, vasopressors, ASA, EKG, Echo,   supportive care    Thank you,  Anais Costello RN, CDS  [phone]  Options provided:  -- STEMI confirmed  -- Type 2 MI  -- Demand Ischemia only, no MI  -- CAD with unstable angina  -- Other - I will add my own diagnosis  -- Disagree - Not applicable / Not valid  -- Disagree - Clinically unable to determine / Unknown  -- Refer to Clinical Documentation Reviewer    PROVIDER RESPONSE TEXT:    This patient has demand ischemia only, no MI.     Query created by: Irma Patrick on 2021 9:25 AM      Electronically signed by:  Jamie Shultz MD 2021 10:26 PM

## (undated) DEVICE — 10FR FRAZIER SUCTION HANDLE: Brand: CARDINAL HEALTH

## (undated) DEVICE — SUTURE VCRL SZ 2-0 L18IN ABSRB UD CT-1 L36MM 1/2 CIR J839D

## (undated) DEVICE — 3M™ TEGADERM™ TRANSPARENT FILM DRESSING FRAME STYLE, 1624W, 2-3/8 IN X 2-3/4 IN (6 CM X 7 CM), 100/CT 4CT/CASE: Brand: 3M™ TEGADERM™

## (undated) DEVICE — ELECTRODE PT RET AD L9FT HI MOIST COND ADH HYDRGEL CORDED

## (undated) DEVICE — CATHETER IV 20GA L1.25IN PNK FEP SFTY STR HUB RADPQ DISP

## (undated) DEVICE — ROYALSILK SURGICAL GOWN, L: Brand: CONVERTORS

## (undated) DEVICE — DRESSING PETRO W7.6XL7.6CM CELOS ACETT NONADHERING MESH

## (undated) DEVICE — BIT DRL L110MM DIA1.8MM QUIK CPL CALIB W/O STP REUSE

## (undated) DEVICE — SPLINT ORTH W3XL12IN LAYERED FBRGLS FOAM PD BRTH BK MOLD

## (undated) DEVICE — GLOVE SURG SZ 65 L12IN FNGR THK79MIL GRN LTX FREE

## (undated) DEVICE — SOLUTION IV IRRIG 500ML 0.9% SODIUM CHL 2F7123

## (undated) DEVICE — GLOVE,SURG,SENSICARE,ALOE,LF,PF,7: Brand: MEDLINE

## (undated) DEVICE — SET GRAV VENT NVENT CK VLV 3 NDL FREE PRT 10 GTT

## (undated) DEVICE — MEDI-VAC NON-CONDUCTIVE SUCTION TUBING: Brand: CARDINAL HEALTH

## (undated) DEVICE — GLOVE ORANGE PI 7   MSG9070

## (undated) DEVICE — SUTURE MCRYL SZ 4-0 L18IN ABSRB UD L19MM PS-2 3/8 CIR PRIM Y496G

## (undated) DEVICE — Device

## (undated) DEVICE — 3M™ STERI-STRIP™ COMPOUND BENZOIN TINCTURE 40 BAGS/CARTON 4 CARTONS/CASE C1544: Brand: 3M™ STERI-STRIP™

## (undated) DEVICE — PADDING CAST W4INXL4YD NONSTERILE COT RAYON MICROPLEATED

## (undated) DEVICE — DRAPE EQUIP C ARM MINI 10000100] TIDI PRODUCTS INC]

## (undated) DEVICE — BIT DRL L100MM DIA2MM QUIK CPL W/O STP REUSE

## (undated) DEVICE — PENCIL ES L3M BTTN SWCH S STL HEX LOK BLDE ELECTRD HOLSTER

## (undated) DEVICE — COMFO-TEX ELASTIC BANDAGE LATEX FREE, 3INX5YD: Brand: COMFO-TEX™

## (undated) DEVICE — 3M™ STERI-STRIP™ REINFORCED ADHESIVE SKIN CLOSURES, R1547, 1/2 IN X 4 IN (12 MM X 100 MM), 6 STRIPS/ENVELOPE: Brand: 3M™ STERI-STRIP™

## (undated) DEVICE — ZIMMER® STERILE DISPOSABLE TOURNIQUET CUFF WITH PLC, DUAL PORT, SINGLE BLADDER, 18 IN. (46 CM)

## (undated) DEVICE — SET ADMIN PRIMING 7ML L30IN 7.35LB 20 GTT 2ND RLER CLMP

## (undated) DEVICE — DRESSING ADPTC N ADH 3X3IN

## (undated) DEVICE — GLOVE SURG SZ 65 L12IN FNGR THK94MIL STD WHT LTX FREE

## (undated) DEVICE — SPIROMETER DIAG AD 4L INCENT 1 W VLV DISP AIRLFE

## (undated) DEVICE — SOLUTION IV 1000ML LAC RINGERS PH 6.5 INJ USP VIAFLX PLAS

## (undated) DEVICE — MATERIAL PD W2XL4YD ST COT CAST SPLNT NONADHESIVE SPEC

## (undated) DEVICE — COMFO-TEX ELASTIC BANDAGE LATEX FREE, 4INX5YD: Brand: COMFO-TEX™